# Patient Record
Sex: FEMALE | Race: WHITE | NOT HISPANIC OR LATINO | Employment: OTHER | URBAN - METROPOLITAN AREA
[De-identification: names, ages, dates, MRNs, and addresses within clinical notes are randomized per-mention and may not be internally consistent; named-entity substitution may affect disease eponyms.]

---

## 2023-05-23 LAB
ALANINE AMINOTRANSFERASE (SGPT) (U/L) IN SER/PLAS: 18 U/L (ref 7–45)
ALBUMIN (G/DL) IN SER/PLAS: 4.1 G/DL (ref 3.4–5)
ALKALINE PHOSPHATASE (U/L) IN SER/PLAS: 87 U/L (ref 33–136)
ANION GAP IN SER/PLAS: 15 MMOL/L (ref 10–20)
ASPARTATE AMINOTRANSFERASE (SGOT) (U/L) IN SER/PLAS: 14 U/L (ref 9–39)
BASOPHILS (10*3/UL) IN BLOOD BY AUTOMATED COUNT: 0.07 X10E9/L (ref 0–0.1)
BASOPHILS/100 LEUKOCYTES IN BLOOD BY AUTOMATED COUNT: 0.9 % (ref 0–2)
BILIRUBIN TOTAL (MG/DL) IN SER/PLAS: 0.6 MG/DL (ref 0–1.2)
CALCIUM (MG/DL) IN SER/PLAS: 9.2 MG/DL (ref 8.6–10.3)
CARBON DIOXIDE, TOTAL (MMOL/L) IN SER/PLAS: 20 MMOL/L (ref 21–32)
CHLORIDE (MMOL/L) IN SER/PLAS: 105 MMOL/L (ref 98–107)
CHOLESTEROL (MG/DL) IN SER/PLAS: 251 MG/DL (ref 0–199)
CHOLESTEROL IN HDL (MG/DL) IN SER/PLAS: 32.4 MG/DL
CHOLESTEROL/HDL RATIO: 7.7
CREATININE (MG/DL) IN SER/PLAS: 0.65 MG/DL (ref 0.5–1.05)
EOSINOPHILS (10*3/UL) IN BLOOD BY AUTOMATED COUNT: 0.15 X10E9/L (ref 0–0.4)
EOSINOPHILS/100 LEUKOCYTES IN BLOOD BY AUTOMATED COUNT: 2 % (ref 0–6)
ERYTHROCYTE DISTRIBUTION WIDTH (RATIO) BY AUTOMATED COUNT: 11.9 % (ref 11.5–14.5)
ERYTHROCYTE MEAN CORPUSCULAR HEMOGLOBIN CONCENTRATION (G/DL) BY AUTOMATED: 34 G/DL (ref 32–36)
ERYTHROCYTE MEAN CORPUSCULAR VOLUME (FL) BY AUTOMATED COUNT: 91 FL (ref 80–100)
ERYTHROCYTES (10*6/UL) IN BLOOD BY AUTOMATED COUNT: 4.75 X10E12/L (ref 4–5.2)
GFR FEMALE: >90 ML/MIN/1.73M2
GLUCOSE (MG/DL) IN SER/PLAS: 110 MG/DL (ref 74–99)
HEMATOCRIT (%) IN BLOOD BY AUTOMATED COUNT: 43 % (ref 36–46)
HEMOGLOBIN (G/DL) IN BLOOD: 14.6 G/DL (ref 12–16)
IMMATURE GRANULOCYTES/100 LEUKOCYTES IN BLOOD BY AUTOMATED COUNT: 0.4 % (ref 0–0.9)
LDL: 162 MG/DL (ref 0–99)
LEUKOCYTES (10*3/UL) IN BLOOD BY AUTOMATED COUNT: 7.7 X10E9/L (ref 4.4–11.3)
LYMPHOCYTES (10*3/UL) IN BLOOD BY AUTOMATED COUNT: 2.77 X10E9/L (ref 0.8–3)
LYMPHOCYTES/100 LEUKOCYTES IN BLOOD BY AUTOMATED COUNT: 36.2 % (ref 13–44)
MONOCYTES (10*3/UL) IN BLOOD BY AUTOMATED COUNT: 0.69 X10E9/L (ref 0.05–0.8)
MONOCYTES/100 LEUKOCYTES IN BLOOD BY AUTOMATED COUNT: 9 % (ref 2–10)
NEUTROPHILS (10*3/UL) IN BLOOD BY AUTOMATED COUNT: 3.94 X10E9/L (ref 1.6–5.5)
NEUTROPHILS/100 LEUKOCYTES IN BLOOD BY AUTOMATED COUNT: 51.5 % (ref 40–80)
NON HDL CHOLESTEROL: 219 MG/DL
PLATELETS (10*3/UL) IN BLOOD AUTOMATED COUNT: 317 X10E9/L (ref 150–450)
POTASSIUM (MMOL/L) IN SER/PLAS: 4 MMOL/L (ref 3.5–5.3)
PROTEIN TOTAL: 6.7 G/DL (ref 6.4–8.2)
SODIUM (MMOL/L) IN SER/PLAS: 136 MMOL/L (ref 136–145)
TRIGLYCERIDE (MG/DL) IN SER/PLAS: 284 MG/DL (ref 0–149)
UREA NITROGEN (MG/DL) IN SER/PLAS: 16 MG/DL (ref 6–23)
VLDL: 57 MG/DL (ref 0–40)

## 2023-06-06 RX ORDER — MECOBALAMIN 1000 MCG
TABLET,CHEWABLE ORAL DAILY
COMMUNITY
Start: 2022-12-07

## 2023-06-06 RX ORDER — DESMOPRESSIN ACETATE 0.1 MG/1
0.25 TABLET ORAL DAILY
COMMUNITY
Start: 2016-03-03 | End: 2023-06-07 | Stop reason: SDUPTHER

## 2023-06-06 RX ORDER — GABAPENTIN 300 MG/1
1 CAPSULE ORAL NIGHTLY
COMMUNITY
Start: 2019-11-21

## 2023-06-06 RX ORDER — GABAPENTIN 100 MG/1
100 CAPSULE ORAL DAILY
COMMUNITY
Start: 2017-04-11

## 2023-06-06 RX ORDER — CLONAZEPAM 0.5 MG/1
0.5 TABLET ORAL 2 TIMES DAILY PRN
COMMUNITY
Start: 2016-02-17

## 2023-06-06 RX ORDER — MULTIVITAMIN
1 TABLET ORAL DAILY
COMMUNITY
Start: 2022-12-07

## 2023-06-06 RX ORDER — OLMESARTAN MEDOXOMIL 40 MG/1
1 TABLET ORAL DAILY
COMMUNITY
Start: 2020-05-05 | End: 2023-06-07 | Stop reason: SDUPTHER

## 2023-06-06 RX ORDER — MINERAL OIL
ENEMA (ML) RECTAL DAILY
COMMUNITY

## 2023-06-07 ENCOUNTER — OFFICE VISIT (OUTPATIENT)
Dept: PRIMARY CARE | Facility: CLINIC | Age: 73
End: 2023-06-07
Payer: MEDICARE

## 2023-06-07 VITALS
HEART RATE: 70 BPM | TEMPERATURE: 96.4 F | DIASTOLIC BLOOD PRESSURE: 68 MMHG | SYSTOLIC BLOOD PRESSURE: 128 MMHG | OXYGEN SATURATION: 98 %

## 2023-06-07 DIAGNOSIS — I10 ESSENTIAL HYPERTENSION: ICD-10-CM

## 2023-06-07 DIAGNOSIS — E78.00 HYPERCHOLESTEROLEMIA: ICD-10-CM

## 2023-06-07 DIAGNOSIS — Z00.00 MEDICARE ANNUAL WELLNESS VISIT, SUBSEQUENT: Primary | ICD-10-CM

## 2023-06-07 DIAGNOSIS — E23.2 DI (DIABETES INSIPIDUS) (MULTI): ICD-10-CM

## 2023-06-07 PROBLEM — C64.2 RENAL CELL CARCINOMA OF LEFT KIDNEY (MULTI): Status: ACTIVE | Noted: 2023-06-07

## 2023-06-07 PROBLEM — F41.9 ANXIETY: Status: ACTIVE | Noted: 2023-06-07

## 2023-06-07 PROCEDURE — G0439 PPPS, SUBSEQ VISIT: HCPCS | Performed by: INTERNAL MEDICINE

## 2023-06-07 PROCEDURE — 99214 OFFICE O/P EST MOD 30 MIN: CPT | Performed by: INTERNAL MEDICINE

## 2023-06-07 PROCEDURE — 3074F SYST BP LT 130 MM HG: CPT | Performed by: INTERNAL MEDICINE

## 2023-06-07 PROCEDURE — 1036F TOBACCO NON-USER: CPT | Performed by: INTERNAL MEDICINE

## 2023-06-07 PROCEDURE — 3078F DIAST BP <80 MM HG: CPT | Performed by: INTERNAL MEDICINE

## 2023-06-07 PROCEDURE — 1159F MED LIST DOCD IN RCRD: CPT | Performed by: INTERNAL MEDICINE

## 2023-06-07 PROCEDURE — 1170F FXNL STATUS ASSESSED: CPT | Performed by: INTERNAL MEDICINE

## 2023-06-07 RX ORDER — OLMESARTAN MEDOXOMIL 40 MG/1
40 TABLET ORAL DAILY
Qty: 90 TABLET | Refills: 1 | Status: SHIPPED | OUTPATIENT
Start: 2023-06-07 | End: 2023-12-08 | Stop reason: SDUPTHER

## 2023-06-07 RX ORDER — DESMOPRESSIN ACETATE 0.1 MG/1
0.25 TABLET ORAL DAILY
Qty: 225 TABLET | Refills: 1 | Status: SHIPPED | OUTPATIENT
Start: 2023-06-07 | End: 2023-11-14 | Stop reason: SDUPTHER

## 2023-06-07 ASSESSMENT — ENCOUNTER SYMPTOMS
LOSS OF SENSATION IN FEET: 0
GASTROINTESTINAL NEGATIVE: 1
RESPIRATORY NEGATIVE: 1
DEPRESSION: 0
NEUROLOGICAL NEGATIVE: 1
CONSTITUTIONAL NEGATIVE: 1
CARDIOVASCULAR NEGATIVE: 1
EYES NEGATIVE: 1
ALLERGIC/IMMUNOLOGIC NEGATIVE: 1
OCCASIONAL FEELINGS OF UNSTEADINESS: 0
MUSCULOSKELETAL NEGATIVE: 1
PSYCHIATRIC NEGATIVE: 1
ENDOCRINE NEGATIVE: 1
HEMATOLOGIC/LYMPHATIC NEGATIVE: 1

## 2023-06-07 ASSESSMENT — ACTIVITIES OF DAILY LIVING (ADL)
TAKING_MEDICATION: INDEPENDENT
GROCERY_SHOPPING: INDEPENDENT
BATHING: INDEPENDENT
MANAGING_FINANCES: INDEPENDENT
DRESSING: INDEPENDENT
DOING_HOUSEWORK: INDEPENDENT

## 2023-06-07 NOTE — PROGRESS NOTES
Subjective   Patient ID: Henri Escobar is a 72 y.o. female who presents for 6 month follow up  and Medicare Annual Wellness Visit Subsequent.  Patient presents in follow up regarding hypertension.  Blood pressure has been well controlled on current therapy.  No adverse effects of medication reported.  Patient denies chest pain, palpitations, shortness of breath, orthopnea, fatigue or edema.    DI is well compensated on current therapy.    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  She states this is getting worse and controlling her life.  She has been tried on all SSRI's as well as Buspar without improvement.          Review of Systems   Constitutional: Negative.    HENT: Negative.     Eyes: Negative.    Respiratory: Negative.     Cardiovascular: Negative.    Gastrointestinal: Negative.    Endocrine: Negative.    Genitourinary: Negative.    Musculoskeletal: Negative.    Skin: Negative.    Allergic/Immunologic: Negative.    Neurological: Negative.    Hematological: Negative.    Psychiatric/Behavioral: Negative.         Objective     Blood pressure 128/68, pulse 70, temperature 35.8 °C (96.4 °F), temperature source Temporal, SpO2 98 %.   Physical Exam  Vitals reviewed.   Constitutional:       Appearance: Normal appearance.   Neck:      Vascular: No carotid bruit.   Cardiovascular:      Rate and Rhythm: Normal rate and regular rhythm.      Pulses: Normal pulses.      Heart sounds: Normal heart sounds. No murmur heard.  Pulmonary:      Effort: Pulmonary effort is normal.      Breath sounds: Normal breath sounds. No wheezing or rales.   Abdominal:      General: Abdomen is flat. There is no distension.      Palpations: Abdomen is soft.      Tenderness: There is no abdominal tenderness.   Musculoskeletal:         General: Normal range of motion.      Cervical back: Normal range of motion and neck supple.    Skin:     General: Skin is warm and dry.   Neurological:      General: No focal deficit present.      Mental Status: She is alert and oriented to person, place, and time.   Psychiatric:         Mood and Affect: Mood normal.         Behavior: Behavior normal.         Assessment/Plan   Problem List Items Addressed This Visit          Nervous    DI (diabetes insipidus) (CMS/[facility])       Circulatory    Essential hypertension       Other    Hypercholesterolemia    Medicare annual wellness visit, subsequent - Primary     Medicare annual wellness completed with no new findings or issues identified.  Patient has documented advanced care planning and POA in place  BP well controlled on current therapy.  Will continue present therapy and follow.  Diabetes insipidus remains well controlled with current therapy.  She has improved LDL level by almost 50 points over the last 6 months with diet and exercise.  She is encouraged as it is noted she is intolerant of all statins and Zetia.  In re:  panic, I recommend she see Dr. Mechelle Asencio for psychologic counseling.    > 30 minutes was spent with the patient today, the majority of which was spent on review of history and medications as well as counselling on care plan and/or coordination of care.     Follow up in 6 months  Lab to be drawn 1 week prior to next office visit.

## 2023-07-18 RX ORDER — DESVENLAFAXINE SUCCINATE 50 MG/1
TABLET, EXTENDED RELEASE ORAL 3 TIMES DAILY
COMMUNITY
Start: 2023-01-12 | End: 2023-12-08 | Stop reason: WASHOUT

## 2023-07-19 ENCOUNTER — OFFICE VISIT (OUTPATIENT)
Dept: PRIMARY CARE | Facility: CLINIC | Age: 73
End: 2023-07-19
Payer: MEDICARE

## 2023-07-19 VITALS
DIASTOLIC BLOOD PRESSURE: 82 MMHG | HEART RATE: 64 BPM | TEMPERATURE: 96.9 F | OXYGEN SATURATION: 97 % | SYSTOLIC BLOOD PRESSURE: 130 MMHG

## 2023-07-19 DIAGNOSIS — H02.9 EYELID LESION, BENIGN: Primary | ICD-10-CM

## 2023-07-19 PROCEDURE — 3079F DIAST BP 80-89 MM HG: CPT | Performed by: INTERNAL MEDICINE

## 2023-07-19 PROCEDURE — 1036F TOBACCO NON-USER: CPT | Performed by: INTERNAL MEDICINE

## 2023-07-19 PROCEDURE — 1159F MED LIST DOCD IN RCRD: CPT | Performed by: INTERNAL MEDICINE

## 2023-07-19 PROCEDURE — 3075F SYST BP GE 130 - 139MM HG: CPT | Performed by: INTERNAL MEDICINE

## 2023-07-19 PROCEDURE — 99213 OFFICE O/P EST LOW 20 MIN: CPT | Performed by: INTERNAL MEDICINE

## 2023-07-19 ASSESSMENT — ENCOUNTER SYMPTOMS
CONSTITUTIONAL NEGATIVE: 1
EYES NEGATIVE: 1
HEMATOLOGIC/LYMPHATIC NEGATIVE: 1
ENDOCRINE NEGATIVE: 1
MUSCULOSKELETAL NEGATIVE: 1
CARDIOVASCULAR NEGATIVE: 1
PSYCHIATRIC NEGATIVE: 1
ROS SKIN COMMENTS: SEE HPI
GASTROINTESTINAL NEGATIVE: 1
RESPIRATORY NEGATIVE: 1
ALLERGIC/IMMUNOLOGIC NEGATIVE: 1
NEUROLOGICAL NEGATIVE: 1

## 2023-07-19 NOTE — PROGRESS NOTES
Subjective   Patient ID: Henri Escobar is a 72 y.o. female who presents for skin issues .  Patient presents today with a complaint of a skin change of the left upper eye lid medially.  No pain, change in vision or pruritus.  Exam reveals a white nodular plaque formation over the area described with no other noted skin changes.        Review of Systems   Constitutional: Negative.    HENT: Negative.     Eyes: Negative.    Respiratory: Negative.     Cardiovascular: Negative.    Gastrointestinal: Negative.    Endocrine: Negative.    Genitourinary: Negative.    Musculoskeletal: Negative.    Skin: Negative.         See HPI   Allergic/Immunologic: Negative.    Neurological: Negative.    Hematological: Negative.    Psychiatric/Behavioral: Negative.         Objective     Blood pressure 130/82, pulse 64, temperature 36.1 °C (96.9 °F), temperature source Temporal, SpO2 97 %.   Physical Exam  Constitutional:       Appearance: Normal appearance.   HENT:      Head: Normocephalic and atraumatic.   Eyes:      Comments: See description in HPI   Pulmonary:      Effort: Pulmonary effort is normal.   Musculoskeletal:      Cervical back: Normal range of motion and neck supple.   Neurological:      Mental Status: She is alert.         Assessment/Plan   Problem List Items Addressed This Visit    None  Visit Diagnoses       Eyelid lesion, benign    -  Primary    Relevant Orders    Referral to Ophthalmology          I advised the patient that I would recommend an oculo-plastics eye specialist.  I called [facility] on her behalf to get the name of their specialist and then did generic referral and wrote the name of the surgeon on the referral form for her.  She is reassured.    Follow up as scheduled

## 2023-11-14 DIAGNOSIS — E23.2 DI (DIABETES INSIPIDUS) (MULTI): ICD-10-CM

## 2023-11-14 RX ORDER — DESMOPRESSIN ACETATE 0.1 MG/1
0.25 TABLET ORAL DAILY
Qty: 75 TABLET | Refills: 0 | Status: SHIPPED | OUTPATIENT
Start: 2023-11-14 | End: 2023-12-08 | Stop reason: SDUPTHER

## 2023-11-14 NOTE — TELEPHONE ENCOUNTER
Desmopressin ordered at office visit in July is Lost in [place]. The mail order pharmacy has done an override on the prescription. Can  you order a 30 day supply from local pharmacy to hold her until the mail order supply reaches her?    Local pharmacy is [facility] in [place]

## 2023-12-06 ENCOUNTER — APPOINTMENT (OUTPATIENT)
Dept: PRIMARY CARE | Facility: CLINIC | Age: 73
End: 2023-12-06
Payer: MEDICARE

## 2023-12-07 ENCOUNTER — LAB (OUTPATIENT)
Dept: LAB | Facility: LAB | Age: 73
End: 2023-12-07
Payer: MEDICARE

## 2023-12-07 DIAGNOSIS — E23.2 DI (DIABETES INSIPIDUS) (MULTI): ICD-10-CM

## 2023-12-07 DIAGNOSIS — E78.00 HYPERCHOLESTEROLEMIA: ICD-10-CM

## 2023-12-07 DIAGNOSIS — I10 ESSENTIAL HYPERTENSION: ICD-10-CM

## 2023-12-07 LAB
ALBUMIN SERPL BCP-MCNC: 4.3 G/DL (ref 3.4–5)
ALP SERPL-CCNC: 96 U/L (ref 33–136)
ALT SERPL W P-5'-P-CCNC: 20 U/L (ref 7–45)
ANION GAP SERPL CALC-SCNC: 11 MMOL/L (ref 10–20)
AST SERPL W P-5'-P-CCNC: 12 U/L (ref 9–39)
BASOPHILS # BLD AUTO: 0.07 X10*3/UL (ref 0–0.1)
BASOPHILS NFR BLD AUTO: 0.8 %
BILIRUB SERPL-MCNC: 0.3 MG/DL (ref 0–1.2)
BUN SERPL-MCNC: 25 MG/DL (ref 6–23)
CALCIUM SERPL-MCNC: 9.4 MG/DL (ref 8.6–10.3)
CHLORIDE SERPL-SCNC: 105 MMOL/L (ref 98–107)
CHOLEST SERPL-MCNC: 283 MG/DL (ref 0–199)
CHOLESTEROL/HDL RATIO: 7.7
CO2 SERPL-SCNC: 27 MMOL/L (ref 21–32)
CREAT SERPL-MCNC: 0.55 MG/DL (ref 0.5–1.05)
EOSINOPHIL # BLD AUTO: 0.17 X10*3/UL (ref 0–0.4)
EOSINOPHIL NFR BLD AUTO: 1.9 %
ERYTHROCYTE [DISTWIDTH] IN BLOOD BY AUTOMATED COUNT: 12.2 % (ref 11.5–14.5)
GFR SERPL CREATININE-BSD FRML MDRD: >90 ML/MIN/1.73M*2
GLUCOSE SERPL-MCNC: 106 MG/DL (ref 74–99)
HCT VFR BLD AUTO: 44.6 % (ref 36–46)
HDLC SERPL-MCNC: 36.7 MG/DL
HGB BLD-MCNC: 14.6 G/DL (ref 12–16)
IMM GRANULOCYTES # BLD AUTO: 0.05 X10*3/UL (ref 0–0.5)
IMM GRANULOCYTES NFR BLD AUTO: 0.6 % (ref 0–0.9)
LDLC SERPL CALC-MCNC: ABNORMAL MG/DL
LYMPHOCYTES # BLD AUTO: 3.36 X10*3/UL (ref 0.8–3)
LYMPHOCYTES NFR BLD AUTO: 38.4 %
MCH RBC QN AUTO: 30.9 PG (ref 26–34)
MCHC RBC AUTO-ENTMCNC: 32.7 G/DL (ref 32–36)
MCV RBC AUTO: 95 FL (ref 80–100)
MONOCYTES # BLD AUTO: 0.71 X10*3/UL (ref 0.05–0.8)
MONOCYTES NFR BLD AUTO: 8.1 %
NEUTROPHILS # BLD AUTO: 4.39 X10*3/UL (ref 1.6–5.5)
NEUTROPHILS NFR BLD AUTO: 50.2 %
NON HDL CHOLESTEROL: 246 MG/DL (ref 0–149)
NRBC BLD-RTO: 0 /100 WBCS (ref 0–0)
PLATELET # BLD AUTO: 358 X10*3/UL (ref 150–450)
POTASSIUM SERPL-SCNC: 4 MMOL/L (ref 3.5–5.3)
PROT SERPL-MCNC: 6.7 G/DL (ref 6.4–8.2)
RBC # BLD AUTO: 4.72 X10*6/UL (ref 4–5.2)
SODIUM SERPL-SCNC: 139 MMOL/L (ref 136–145)
TRIGL SERPL-MCNC: 463 MG/DL (ref 0–149)
VLDL: ABNORMAL
WBC # BLD AUTO: 8.8 X10*3/UL (ref 4.4–11.3)

## 2023-12-07 PROCEDURE — 80053 COMPREHEN METABOLIC PANEL: CPT

## 2023-12-07 PROCEDURE — 36415 COLL VENOUS BLD VENIPUNCTURE: CPT

## 2023-12-07 PROCEDURE — 85025 COMPLETE CBC W/AUTO DIFF WBC: CPT

## 2023-12-07 PROCEDURE — 80061 LIPID PANEL: CPT

## 2023-12-08 ENCOUNTER — OFFICE VISIT (OUTPATIENT)
Dept: PRIMARY CARE | Facility: CLINIC | Age: 73
End: 2023-12-08
Payer: MEDICARE

## 2023-12-08 VITALS — TEMPERATURE: 97.4 F | SYSTOLIC BLOOD PRESSURE: 132 MMHG | DIASTOLIC BLOOD PRESSURE: 84 MMHG | HEART RATE: 76 BPM

## 2023-12-08 DIAGNOSIS — I10 ESSENTIAL HYPERTENSION: Primary | ICD-10-CM

## 2023-12-08 DIAGNOSIS — E23.2 DI (DIABETES INSIPIDUS) (MULTI): ICD-10-CM

## 2023-12-08 DIAGNOSIS — E78.00 HYPERCHOLESTEROLEMIA: ICD-10-CM

## 2023-12-08 PROBLEM — C64.2 RENAL CELL CARCINOMA OF LEFT KIDNEY (MULTI): Status: RESOLVED | Noted: 2023-06-07 | Resolved: 2023-12-08

## 2023-12-08 PROBLEM — Z85.528 HISTORY OF RENAL CELL CARCINOMA: Status: ACTIVE | Noted: 2023-12-08

## 2023-12-08 PROBLEM — M17.11 PRIMARY OSTEOARTHRITIS OF RIGHT KNEE: Status: ACTIVE | Noted: 2023-12-08

## 2023-12-08 PROCEDURE — 1159F MED LIST DOCD IN RCRD: CPT | Performed by: INTERNAL MEDICINE

## 2023-12-08 PROCEDURE — 3079F DIAST BP 80-89 MM HG: CPT | Performed by: INTERNAL MEDICINE

## 2023-12-08 PROCEDURE — 1036F TOBACCO NON-USER: CPT | Performed by: INTERNAL MEDICINE

## 2023-12-08 PROCEDURE — 3075F SYST BP GE 130 - 139MM HG: CPT | Performed by: INTERNAL MEDICINE

## 2023-12-08 PROCEDURE — 99214 OFFICE O/P EST MOD 30 MIN: CPT | Performed by: INTERNAL MEDICINE

## 2023-12-08 RX ORDER — OLMESARTAN MEDOXOMIL 40 MG/1
40 TABLET ORAL DAILY
Qty: 90 TABLET | Refills: 1 | Status: SHIPPED | OUTPATIENT
Start: 2023-12-08 | End: 2024-01-10 | Stop reason: SDUPTHER

## 2023-12-08 RX ORDER — OLMESARTAN MEDOXOMIL 40 MG/1
40 TABLET ORAL DAILY
Qty: 30 TABLET | Refills: 1 | Status: SHIPPED | OUTPATIENT
Start: 2023-12-08 | End: 2024-01-08 | Stop reason: SDUPTHER

## 2023-12-08 RX ORDER — DESMOPRESSIN ACETATE 0.1 MG/1
0.25 TABLET ORAL DAILY
Qty: 225 TABLET | Refills: 1 | Status: SHIPPED | OUTPATIENT
Start: 2023-12-08 | End: 2024-01-10 | Stop reason: SDUPTHER

## 2023-12-08 ASSESSMENT — ENCOUNTER SYMPTOMS
HEMATOLOGIC/LYMPHATIC NEGATIVE: 1
ENDOCRINE NEGATIVE: 1
CONSTITUTIONAL NEGATIVE: 1
GASTROINTESTINAL NEGATIVE: 1
PSYCHIATRIC NEGATIVE: 1
RESPIRATORY NEGATIVE: 1
MUSCULOSKELETAL NEGATIVE: 1
EYES NEGATIVE: 1
ALLERGIC/IMMUNOLOGIC NEGATIVE: 1
CARDIOVASCULAR NEGATIVE: 1
NEUROLOGICAL NEGATIVE: 1

## 2023-12-08 NOTE — PROGRESS NOTES
Subjective   Patient ID: Henir Escobar is a 73 y.o. female who presents for 6 month follow up .  Patient presents in follow up regarding hypertension.  Blood pressure has been well controlled on current therapy.  No adverse effects of medication reported.  Patient denies chest pain, palpitations, shortness of breath, orthopnea, fatigue or edema.    DI is well compensated on current therapy.            Review of Systems   Constitutional: Negative.    HENT: Negative.     Eyes: Negative.    Respiratory: Negative.     Cardiovascular: Negative.    Gastrointestinal: Negative.    Endocrine: Negative.    Genitourinary: Negative.    Musculoskeletal: Negative.    Skin: Negative.    Allergic/Immunologic: Negative.    Neurological: Negative.    Hematological: Negative.    Psychiatric/Behavioral: Negative.         Objective     Blood pressure 132/84, pulse 76, temperature 36.3 °C (97.4 °F), temperature source Temporal.   Physical Exam  Vitals reviewed.   Constitutional:       Appearance: Normal appearance.   Neck:      Vascular: No carotid bruit.   Cardiovascular:      Rate and Rhythm: Normal rate and regular rhythm.      Pulses: Normal pulses.      Heart sounds: Normal heart sounds. No murmur heard.  Pulmonary:      Effort: Pulmonary effort is normal.      Breath sounds: Normal breath sounds. No wheezing or rales.   Abdominal:      General: Abdomen is flat. There is no distension.      Palpations: Abdomen is soft.      Tenderness: There is no abdominal tenderness.   Musculoskeletal:         General: Normal range of motion.      Cervical back: Normal range of motion and neck supple.   Skin:     General: Skin is warm and dry.   Neurological:      General: No focal deficit present.      Mental Status: She is alert and oriented to person, place, and time.   Psychiatric:         Mood and Affect: Mood normal.         Behavior: Behavior normal.         Assessment/Plan   Problem List Items Addressed This Visit       DI (diabetes  "insipidus) (CMS/[facility])    Relevant Medications    desmopressin (DDAVP) 0.1 mg tablet    Other Relevant Orders    Comprehensive Metabolic Panel    Essential hypertension - Primary    Relevant Medications    olmesartan (BENIcar) 40 mg tablet    olmesartan (BENIcar) 40 mg tablet    Other Relevant Orders    CT cardiac scoring wo IV contrast    Comprehensive Metabolic Panel    CBC and Auto Differential    Hypercholesterolemia    Relevant Orders    CT cardiac scoring wo IV contrast       BP well controlled on current therapy.  Will continue present therapy and follow.  Diabetes insipidus remains well controlled with current therapy.  Lipids are reviewed showing a rise in total cholesterol as well as triglycerides.  Trig level >400 so LDL is not able to be calculated.  She is encouraged as it is noted she is intolerant of all statins and Zetia.  In view of the fact that she is at age 73 with no CV history, it may not be imperative to intervene.  I suggest that coronary calcium scoring be done to determine current risk.  Order is entered and she will decide if she wants to do this or not.  Will not plan on checking lipid with next lab based on the above.  In re:  panic with being in a car, I recommended at last that she see Dr. Mechelle Asencio for psychologic counseling.   She did not see her due to coverage issues.  She states she is seeing a different psychologist and currently rides in the back when  is driving or drives on back roads only if she drives.    It is noted today that the patient is clearly upset but told me \"I'm fine.\"  At the end of the visit, it became clearer as to why she was upset which had to do with her mail order pharmacy not sending her refills at the appropriate time and one of her medications getting \"lost\" in [place] such that she needed to get a 30 day supply locally which we happily did for her.  She states that the mail order pharmacy told her that they had sent requests to us for " refills that she was not yet due for which were not answered.  I explained that the software system is set up to reject these requests because 99.9% of them were made far too early and that it was her responsibility to let us know if she was short on medication likely due to not having the correct amount sent to her in the first place.    > 30 minutes was spent with the patient today, the majority of which was spent on review of history and medications as well as counselling on care plan and/or coordination of care.     Follow up in 6 months  Lab to be drawn 1 week prior to next office visit.

## 2023-12-12 ENCOUNTER — HOSPITAL ENCOUNTER (OUTPATIENT)
Dept: RADIOLOGY | Facility: HOSPITAL | Age: 73
Discharge: HOME | End: 2023-12-12
Payer: MEDICARE

## 2023-12-12 DIAGNOSIS — I10 ESSENTIAL HYPERTENSION: ICD-10-CM

## 2023-12-12 DIAGNOSIS — E78.00 HYPERCHOLESTEROLEMIA: ICD-10-CM

## 2023-12-12 PROCEDURE — 75571 CT HRT W/O DYE W/CA TEST: CPT

## 2023-12-15 ENCOUNTER — TELEPHONE (OUTPATIENT)
Dept: OBSTETRICS AND GYNECOLOGY | Facility: CLINIC | Age: 73
End: 2023-12-15
Payer: MEDICARE

## 2023-12-15 DIAGNOSIS — Z12.31 SCREENING MAMMOGRAM FOR BREAST CANCER: ICD-10-CM

## 2024-01-08 ENCOUNTER — HOSPITAL ENCOUNTER (OUTPATIENT)
Dept: RADIOLOGY | Facility: HOSPITAL | Age: 74
Discharge: HOME | End: 2024-01-08
Payer: MEDICARE

## 2024-01-08 DIAGNOSIS — Z12.31 SCREENING MAMMOGRAM FOR BREAST CANCER: ICD-10-CM

## 2024-01-08 DIAGNOSIS — I10 ESSENTIAL HYPERTENSION: ICD-10-CM

## 2024-01-08 PROCEDURE — 77067 SCR MAMMO BI INCL CAD: CPT

## 2024-01-08 PROCEDURE — 77063 BREAST TOMOSYNTHESIS BI: CPT

## 2024-01-08 RX ORDER — OLMESARTAN MEDOXOMIL 40 MG/1
40 TABLET ORAL DAILY
Qty: 30 TABLET | Refills: 0 | Status: SHIPPED | OUTPATIENT
Start: 2024-01-08 | End: 2024-01-10 | Stop reason: SDUPTHER

## 2024-01-08 NOTE — TELEPHONE ENCOUNTER
Patient stated that [facility] is under a new company and the Olmesartan 40 MG prescription was cancelled and the patient is asking for another 30 day supply to be sent to [facility].      Rx Refill Request Telephone Encounter    Name:  Henri Escobar  :  [id number]  Medication Name:      olmesartan (BENIcar) 40 mg tablet [[id number]]             Specific Pharmacy location:   [facility]  Date of last appointment:  2023  Date of next appointment:  2024  Best number to reach patient:  [phone]

## 2024-01-10 DIAGNOSIS — E23.2 DI (DIABETES INSIPIDUS) (MULTI): ICD-10-CM

## 2024-01-10 DIAGNOSIS — I10 ESSENTIAL HYPERTENSION: ICD-10-CM

## 2024-01-10 RX ORDER — OLMESARTAN MEDOXOMIL 40 MG/1
40 TABLET ORAL DAILY
Qty: 90 TABLET | Refills: 1 | Status: SHIPPED | OUTPATIENT
Start: 2024-01-10 | End: 2024-05-03 | Stop reason: SDUPTHER

## 2024-01-10 RX ORDER — DESMOPRESSIN ACETATE 0.1 MG/1
0.25 TABLET ORAL DAILY
Qty: 225 TABLET | Refills: 1 | Status: SHIPPED | OUTPATIENT
Start: 2024-01-10 | End: 2024-05-03 | Stop reason: SDUPTHER

## 2024-01-10 NOTE — TELEPHONE ENCOUNTER
Patient said the reason this needs resent to [facility] again is because the pharmacy changed disrupters and did not send over her scripts when this occurred. She is also requesting a 90 day of desmopressin be resent over as well. Please advise.

## 2024-02-23 ENCOUNTER — TELEPHONE (OUTPATIENT)
Dept: UROLOGY | Facility: CLINIC | Age: 74
End: 2024-02-23
Payer: MEDICARE

## 2024-02-23 DIAGNOSIS — C64.9 PRIMARY MALIGNANT NEOPLASM OF KIDNEY WITH METASTASIS FROM KIDNEY TO OTHER SITE, UNSPECIFIED LATERALITY (MULTI): Primary | ICD-10-CM

## 2024-02-23 NOTE — TELEPHONE ENCOUNTER
Pt calling in wanting to know if we can resubmit a CT Urography w 3D volume Rendered Imaging and CT abdomen pelvis w IV Contrast just so that she can get in it done in July. The expiration date on her current ones end in June and she needs the insurance to cover it after a year of her last one.

## 2024-02-26 ENCOUNTER — TELEPHONE (OUTPATIENT)
Dept: UROLOGY | Facility: CLINIC | Age: 74
End: 2024-02-26
Payer: MEDICARE

## 2024-02-26 DIAGNOSIS — C64.9 PRIMARY MALIGNANT NEOPLASM OF KIDNEY WITH METASTASIS FROM KIDNEY TO OTHER SITE, UNSPECIFIED LATERALITY (MULTI): Primary | ICD-10-CM

## 2024-02-26 NOTE — TELEPHONE ENCOUNTER
Patient asking that a new order be put in for CT abd and pelvis w/contrast to be done prior to coming in to see Dr. Benedict in July.  The one he put in for 6/23 is not will not be enough time per insurance  Thank you

## 2024-03-17 PROBLEM — Z01.419 WELL WOMAN EXAM WITH ROUTINE GYNECOLOGICAL EXAM: Status: ACTIVE | Noted: 2024-03-17

## 2024-03-17 NOTE — PROGRESS NOTES
Subjective   Patient ID: Henri Escobar is a 73 y.o. female who presents for Annual Exam.  Last annual exam with me was on 2/28/2023.   She is s/p supracervical hysterectomy. Last pap was negative in 2017 and she denies any history of cervical dysplasia. DEXA in 2019 returned with mild osteopenia of the hip.     She notes a bump in labia which she feels is a blocked hair follicle. She notes these more when she wears yoga pants. They resolve when they drain.     She is very concerned about her weight and her fatty liver. She also is seeing a psychiatrist and has done well with Wellbutrin in the past but developed side effects from this. She is very unhappy with her health and weight now and also states her sister has done remarkably well with Ozempic. She is very interested in trying this for health reasons along with hopes for this to help quiet her internal voice. We discussed this can be prescribed by her PCP or through the Weight loss center.           Review of Systems   Constitutional:  Negative for activity change.   HENT:  Negative for congestion.    Respiratory:  Negative for apnea and cough.    Cardiovascular:  Negative for chest pain.   Gastrointestinal:  Negative for constipation and diarrhea.   Genitourinary:  Negative for hematuria and vaginal pain.   Musculoskeletal:  Negative for joint swelling.   Neurological:  Negative for dizziness.   Psychiatric/Behavioral:  Negative for agitation.        Past Medical History:   Diagnosis Date    Personal history of other diseases of the circulatory system     History of hypertension      Past Surgical History:   Procedure Laterality Date    CHOLECYSTECTOMY      HYSTERECTOMY  02/08/2017    Hysterectomy    NEPHRECTOMY  07/20/2017    Nephrectomy Left    OOPHORECTOMY      TUBAL LIGATION        Allergies   Allergen Reactions    Ezetimibe Other    Metoprolol Other and Unknown    Mold Unknown    Omnicef [Cefdinir] Hives    Penicillins Hives    Statins-Hmg-Coa  Reductase Inhibitors Other    Cephalosporins Hives, Rash and Unknown    Codeine Hives and Rash      Current Outpatient Medications on File Prior to Visit   Medication Sig Dispense Refill    ascorbic acid/collagen hydr (COLLAGEN SKIN RENEWAL ORAL) Take by mouth once daily.      clonazePAM (KlonoPIN) 0.5 mg tablet Take 1 tablet (0.5 mg) by mouth 2 times a day as needed.      desmopressin (DDAVP) 0.1 mg tablet Take 2.5 tablets (0.25 mg) by mouth once daily. 225 tablet 1    fexofenadine (Allegra) 180 mg tablet Take by mouth once daily.      gabapentin (Neurontin) 100 mg capsule Take 1 capsule (100 mg) by mouth once daily.      gabapentin (Neurontin) 300 mg capsule Take 1 capsule (300 mg) by mouth once daily at bedtime.      mecobalamin, vitamin B12, 1,000 mcg tablet,chewable Chew once daily.      multivitamin with minerals (multivitamin with folic acid) tablet Take 1 tablet by mouth once daily.      olmesartan (BENIcar) 40 mg tablet Take 1 tablet (40 mg) by mouth once daily. 90 tablet 1     No current facility-administered medications on file prior to visit.        Objective   Physical Exam  Constitutional:       Appearance: Normal appearance.   Neck:      Thyroid: No thyromegaly.   Cardiovascular:      Rate and Rhythm: Normal rate and regular rhythm.      Heart sounds: Normal heart sounds.   Pulmonary:      Effort: Pulmonary effort is normal.      Breath sounds: Normal breath sounds.   Chest:      Chest wall: No mass.   Breasts:     Right: Normal. No inverted nipple, mass, nipple discharge or skin change.      Left: Normal. No inverted nipple, mass, nipple discharge or skin change.   Abdominal:      General: There is no distension.      Palpations: Abdomen is soft. There is no mass.      Tenderness: There is no abdominal tenderness.   Genitourinary:     General: Normal vulva.      Exam position: Lithotomy position.      Labia:         Right: No rash.         Left: No rash.       Vagina: Normal. No lesions.      Cervix:  No friability or lesion.      Uterus: Absent.       Adnexa: Right adnexa normal and left adnexa normal.        Right: No mass or tenderness.          Left: No mass or tenderness.        Comments: Atrophy is noted.  Tiny inclusion cyst is noted at right vaginal opening.   Musculoskeletal:         General: No deformity.      Cervical back: Neck supple.   Lymphadenopathy:      Cervical: No cervical adenopathy.   Skin:     General: Skin is warm and dry.      Findings: No rash.   Neurological:      General: No focal deficit present.      Mental Status: She is alert.   Psychiatric:         Mood and Affect: Mood normal.         Behavior: Behavior is cooperative.         Thought Content: Thought content normal.           Problem List Items Addressed This Visit       Well woman exam with routine gynecological exam - Primary    Overview     She is s/p supracervical hysterectomy with normal pap in 2017, no past cervical dysplasia, and no indication for further cervical cancer screening.   Mammogram was 1/2024.  DEXA in 2019 showed mild osteopenia of hip.         Current Assessment & Plan     Pap is not indicated. Mammogram is due in January 2025.   DEXA is ordered.   Regular exercise and attaining/maintaining a healthy weight is encouraged.   Adequate calcium intake with diet or supplements is encouraged.    We will notify of any abnormal results.

## 2024-03-17 NOTE — ASSESSMENT & PLAN NOTE
Pap is not indicated. Mammogram is due in January 2025.   DEXA is recommended but declined.  Regular exercise and attaining/maintaining a healthy weight is encouraged.   Adequate calcium intake with diet or supplements is encouraged.    We will notify of any abnormal results.   
Spontaneous

## 2024-03-20 ENCOUNTER — OFFICE VISIT (OUTPATIENT)
Dept: OBSTETRICS AND GYNECOLOGY | Facility: CLINIC | Age: 74
End: 2024-03-20
Payer: MEDICARE

## 2024-03-20 VITALS — SYSTOLIC BLOOD PRESSURE: 138 MMHG | DIASTOLIC BLOOD PRESSURE: 92 MMHG | HEIGHT: 64 IN | BODY MASS INDEX: 29.18 KG/M2

## 2024-03-20 DIAGNOSIS — Z01.419 WELL WOMAN EXAM WITH ROUTINE GYNECOLOGICAL EXAM: Primary | ICD-10-CM

## 2024-03-20 PROCEDURE — 1036F TOBACCO NON-USER: CPT | Performed by: OBSTETRICS & GYNECOLOGY

## 2024-03-20 PROCEDURE — 99397 PER PM REEVAL EST PAT 65+ YR: CPT | Performed by: OBSTETRICS & GYNECOLOGY

## 2024-03-20 PROCEDURE — 3080F DIAST BP >= 90 MM HG: CPT | Performed by: OBSTETRICS & GYNECOLOGY

## 2024-03-20 PROCEDURE — 1159F MED LIST DOCD IN RCRD: CPT | Performed by: OBSTETRICS & GYNECOLOGY

## 2024-03-20 PROCEDURE — 3075F SYST BP GE 130 - 139MM HG: CPT | Performed by: OBSTETRICS & GYNECOLOGY

## 2024-03-20 PROCEDURE — 1160F RVW MEDS BY RX/DR IN RCRD: CPT | Performed by: OBSTETRICS & GYNECOLOGY

## 2024-03-20 ASSESSMENT — ENCOUNTER SYMPTOMS
HEMATURIA: 0
JOINT SWELLING: 0
DIARRHEA: 0
APNEA: 0
AGITATION: 0
COUGH: 0
DIZZINESS: 0
CONSTIPATION: 0
ACTIVITY CHANGE: 0

## 2024-03-26 ENCOUNTER — OFFICE VISIT (OUTPATIENT)
Dept: PRIMARY CARE | Facility: CLINIC | Age: 74
End: 2024-03-26
Payer: MEDICARE

## 2024-03-26 VITALS
SYSTOLIC BLOOD PRESSURE: 139 MMHG | OXYGEN SATURATION: 98 % | TEMPERATURE: 96.7 F | HEART RATE: 71 BPM | DIASTOLIC BLOOD PRESSURE: 82 MMHG

## 2024-03-26 DIAGNOSIS — I10 ESSENTIAL HYPERTENSION: ICD-10-CM

## 2024-03-26 DIAGNOSIS — E23.2 DI (DIABETES INSIPIDUS) (MULTI): ICD-10-CM

## 2024-03-26 DIAGNOSIS — F41.9 ANXIETY: Primary | ICD-10-CM

## 2024-03-26 PROCEDURE — 1160F RVW MEDS BY RX/DR IN RCRD: CPT | Performed by: INTERNAL MEDICINE

## 2024-03-26 PROCEDURE — 99213 OFFICE O/P EST LOW 20 MIN: CPT | Performed by: INTERNAL MEDICINE

## 2024-03-26 PROCEDURE — 3079F DIAST BP 80-89 MM HG: CPT | Performed by: INTERNAL MEDICINE

## 2024-03-26 PROCEDURE — 1159F MED LIST DOCD IN RCRD: CPT | Performed by: INTERNAL MEDICINE

## 2024-03-26 PROCEDURE — 1036F TOBACCO NON-USER: CPT | Performed by: INTERNAL MEDICINE

## 2024-03-26 PROCEDURE — 3075F SYST BP GE 130 - 139MM HG: CPT | Performed by: INTERNAL MEDICINE

## 2024-03-26 ASSESSMENT — ENCOUNTER SYMPTOMS
ALLERGIC/IMMUNOLOGIC NEGATIVE: 1
CONSTITUTIONAL NEGATIVE: 1
NEUROLOGICAL NEGATIVE: 1
CARDIOVASCULAR NEGATIVE: 1
MUSCULOSKELETAL NEGATIVE: 1
GASTROINTESTINAL NEGATIVE: 1
EYES NEGATIVE: 1
HEMATOLOGIC/LYMPHATIC NEGATIVE: 1
NERVOUS/ANXIOUS: 1
ENDOCRINE NEGATIVE: 1
RESPIRATORY NEGATIVE: 1

## 2024-03-26 NOTE — PROGRESS NOTES
Subjective   Patient ID: Henri Escobar is a 73 y.o. female who presents for wants to talk about the medications .  Patient presents today stating that she wants a prescription for Ozempic.  She does not have DM and is not morbidly obese clinically although she has refused for some time to be weighed in the office.  She states she understands this and wants Ozempic to treat her anxiety.  I informed her that anxiety is not an indication to prescribe a gastroparetic inducing medication.  She states she understands that as well, but states that all of her siblings as well as herself suffer with anxiety and at a recent get-together, one of her sisters looked very good to her.  She states on discussion with her sister who is also not diabetic or obese, her sister informed her that Ozempic was prescribed to her to treat her anxiety and was working wonderfully.  I reviewed most recent available literature which actually confirms that there is a significant increase in the incidence of depression, anxiety and suicidal ideation in those treated with Ozempic.  It is also interesting to note that while Wegovy reports these adverse events in its PI, Ozempic does not and there are potential legal ramifications pending re:  this issue.    She reports that she is planning on having cataract surgery within the next month or so.        Review of Systems   Constitutional: Negative.    HENT: Negative.     Eyes: Negative.    Respiratory: Negative.     Cardiovascular: Negative.    Gastrointestinal: Negative.    Endocrine: Negative.    Genitourinary: Negative.    Musculoskeletal: Negative.    Skin: Negative.    Allergic/Immunologic: Negative.    Neurological: Negative.    Hematological: Negative.    Psychiatric/Behavioral:  The patient is nervous/anxious.        Objective     Blood pressure 139/82, pulse 71, temperature 35.9 °C (96.7 °F), temperature source Temporal, SpO2 98 %, not currently breastfeeding.   Physical  Exam  Constitutional:       Appearance: Normal appearance.   HENT:      Head: Normocephalic and atraumatic.   Musculoskeletal:      Cervical back: Normal range of motion.   Neurological:      General: No focal deficit present.      Mental Status: She is alert and oriented to person, place, and time.   Psychiatric:         Behavior: Behavior normal.         Assessment/Plan   Problem List Items Addressed This Visit       Anxiety - Primary    DI (diabetes insipidus) (CMS/HCC)    Essential hypertension     See HPI re:  reason for visit and prolonged discussion re:  the reasons I will not inappropriately prescribe Ozempic.  Patient does not appear pleased with my thoughts nor my refusal to prescribe a medication for a condition that it in no way, shape or form is indicated.  She does, however, thank me for my time before she left.  Patient presents in follow up regarding hypertension.  Blood pressure has been well controlled on current therapy.  No adverse effects of medication reported.  Patient denies chest pain, palpitations, shortness of breath, orthopnea, fatigue or edema.  DI Sx remain well compensated on current therapy.  Will continue present therapy and follow.    >20 minutes was spent with the patient today, the majority of which was spent on review of history and medications as well as counselling on care plan and/or coordination of care.    Follow up as scheduled

## 2024-05-03 ENCOUNTER — OFFICE VISIT (OUTPATIENT)
Dept: PRIMARY CARE | Facility: CLINIC | Age: 74
End: 2024-05-03
Payer: MEDICARE

## 2024-05-03 VITALS
DIASTOLIC BLOOD PRESSURE: 82 MMHG | HEIGHT: 65 IN | TEMPERATURE: 97.4 F | WEIGHT: 182 LBS | BODY MASS INDEX: 30.32 KG/M2 | OXYGEN SATURATION: 98 % | HEART RATE: 74 BPM | SYSTOLIC BLOOD PRESSURE: 144 MMHG

## 2024-05-03 DIAGNOSIS — I10 ESSENTIAL HYPERTENSION: ICD-10-CM

## 2024-05-03 DIAGNOSIS — E23.2 DI (DIABETES INSIPIDUS) (MULTI): ICD-10-CM

## 2024-05-03 DIAGNOSIS — Z23 NEED FOR TDAP VACCINATION: Primary | ICD-10-CM

## 2024-05-03 PROCEDURE — 99214 OFFICE O/P EST MOD 30 MIN: CPT | Performed by: FAMILY MEDICINE

## 2024-05-03 PROCEDURE — 1160F RVW MEDS BY RX/DR IN RCRD: CPT | Performed by: FAMILY MEDICINE

## 2024-05-03 PROCEDURE — 3079F DIAST BP 80-89 MM HG: CPT | Performed by: FAMILY MEDICINE

## 2024-05-03 PROCEDURE — 1159F MED LIST DOCD IN RCRD: CPT | Performed by: FAMILY MEDICINE

## 2024-05-03 PROCEDURE — 90471 IMMUNIZATION ADMIN: CPT | Performed by: FAMILY MEDICINE

## 2024-05-03 PROCEDURE — 90715 TDAP VACCINE 7 YRS/> IM: CPT | Performed by: FAMILY MEDICINE

## 2024-05-03 PROCEDURE — 3077F SYST BP >= 140 MM HG: CPT | Performed by: FAMILY MEDICINE

## 2024-05-03 PROCEDURE — 1036F TOBACCO NON-USER: CPT | Performed by: FAMILY MEDICINE

## 2024-05-03 RX ORDER — OLMESARTAN MEDOXOMIL 40 MG/1
40 TABLET ORAL DAILY
Qty: 90 TABLET | Refills: 1 | Status: SHIPPED | OUTPATIENT
Start: 2024-05-03

## 2024-05-03 RX ORDER — DESMOPRESSIN ACETATE 0.1 MG/1
0.25 TABLET ORAL DAILY
Qty: 225 TABLET | Refills: 1 | Status: SHIPPED | OUTPATIENT
Start: 2024-05-03

## 2024-05-03 NOTE — Clinical Note
I forgot to mention during checkout, but is it possible to change patient next appointment to 40 min. She will have many topics to address that can't be handled in 20min. Thanks.

## 2024-05-03 NOTE — PATIENT INSTRUCTIONS
We had a lengthy conversation about ozempic. We agreed that ozempic is not indicated for anxiety but is indicated diabetic patients with the benefit of weight loss in thepat population. We discussed that other medications such as zepound wegovy, saxenda have been approved for weight loss specifically. Pt would like to look into weight management. We discussed diet changes and she was provided information for lifestyle medication. She was encouraged to stay physically active 30min daily. We will check her wait today and advised 5-10% weightloss before next appointment in 3 months. We will discuss weightloss option at that time.  I reviewed her labs, her previous notes, imaging and other reports.

## 2024-05-03 NOTE — PROGRESS NOTES
Assessment     ASSESSMENT/PLAN:      Patient Instructions   We had a lengthy conversation about ozempic. We agreed that ozempic is not indicated for anxiety but is indicated diabetic patients with the benefit of weight loss in thepat population. We discussed that other medications such as zepound wegovy, saxenda have been approved for weight loss specifically. Pt would like to look into weight management. We discussed diet changes and she was provided information for lifestyle medication. She was encouraged to stay physically active 30min daily. We will check her wait today and advised 5-10% weightloss before next appointment in 3 months. We will discuss weightloss option at that time.  I reviewed her labs, her previous notes, imaging and other reports.       Signed by: Xochitl Goyal,        FUTURE DIRECTION:   []    Subjective   SUBJECTIVE:     HPI : Patient is a 73 y.o. female who presents today for the following:     Anxiety  Has tried Wellbutrin in the past however experienced side effects so that was discontinued  Currently on gabapentin 300 mg POM 100mg AM   Clonazepam 0.5mg daily   She is being seen by psychiatrist as well   Pt states that she noticed that her sister who is on ozempic has improved mental state since using ozempic, pt was wondering if this could be a similar outcome for her      HTN  Controlled on olmesartan 40 mg daily    Diabetes insipidus   Currently managed with desmopressin 0.1mg daily     HLD  Has been on statin and zetia but caused back ache     OA   Prinamry OA in bilateral lower extremity managed with collagen supplements     Cataracts   S/P surgery on right eye on 4/26/24, plans to have left eye procedure soon  Next left     Review of Systems    Past Medical History:   Diagnosis Date    Personal history of other diseases of the circulatory system     History of hypertension        Past Surgical History:   Procedure Laterality Date    CATARACT EXTRACTION Right 2024     CHOLECYSTECTOMY      HYSTERECTOMY  02/08/2017    Hysterectomy    NEPHRECTOMY  07/20/2017    Nephrectomy Left    OOPHORECTOMY      TUBAL LIGATION          Current Outpatient Medications   Medication Instructions    ascorbic acid/collagen hydr (COLLAGEN SKIN RENEWAL ORAL) oral, Daily    clonazePAM (KLONOPIN) 0.5 mg, oral, 2 times daily PRN    desmopressin (DDAVP) 0.25 mg, oral, Daily    fexofenadine (Allegra) 180 mg tablet oral, Daily    gabapentin (Neurontin) 300 mg capsule 1 capsule, oral, Nightly    gabapentin (NEURONTIN) 100 mg, oral, Daily    mecobalamin, vitamin B12, 1,000 mcg tablet,chewable oral, Daily    multivitamin with minerals (multivitamin with folic acid) tablet 1 tablet, oral, Daily    olmesartan (BENICAR) 40 mg, oral, Daily        Allergies   Allergen Reactions    Ezetimibe Other    Metoprolol Other and Unknown    Mold Unknown    Omnicef [Cefdinir] Hives    Penicillins Hives    Statins-Hmg-Coa Reductase Inhibitors Other    Cephalosporins Hives, Rash and Unknown    Codeine Hives and Rash        Social History     Socioeconomic History    Marital status:      Spouse name: Not on file    Number of children: Not on file    Years of education: Not on file    Highest education level: Not on file   Occupational History    Not on file   Tobacco Use    Smoking status: Never    Smokeless tobacco: Never   Vaping Use    Vaping status: Never Used   Substance and Sexual Activity    Alcohol use: Never    Drug use: Never    Sexual activity: Not Currently   Other Topics Concern    Not on file   Social History Narrative    Not on file     Social Determinants of Health     Financial Resource Strain: Not on file   Food Insecurity: Not on file   Transportation Needs: Not on file   Physical Activity: Not on file   Stress: Not on file   Social Connections: Not on file   Intimate Partner Violence: Not on file   Housing Stability: Not on file        Family History   Problem Relation Name Age of Onset    Prostate cancer  "Father      Hypertension Father      Angina Father      Hypertension Sister      Hypertension Brother      Diabetes Father's Brother      Stroke Maternal Grandmother      Colon cancer Maternal Grandfather      Breast cancer Other maternal niece         Objective     OBJECTIVE:     Vitals:    05/03/24 0838   BP: 144/82   Pulse: 74   Temp: 36.3 °C (97.4 °F)   SpO2: 98%   Weight: 82.6 kg (182 lb)   Height: 1.645 m (5' 4.75\")        Physical Exam  Constitutional:       Appearance: Normal appearance.   HENT:      Head: Normocephalic.   Pulmonary:      Effort: Pulmonary effort is normal.   Musculoskeletal:      Cervical back: Normal range of motion.   Neurological:      Mental Status: She is alert.   Psychiatric:         Mood and Affect: Mood is anxious.             " Clear

## 2024-05-22 ENCOUNTER — APPOINTMENT (OUTPATIENT)
Dept: PRIMARY CARE | Facility: CLINIC | Age: 74
End: 2024-05-22
Payer: MEDICARE

## 2024-05-30 ENCOUNTER — APPOINTMENT (OUTPATIENT)
Dept: PRIMARY CARE | Facility: CLINIC | Age: 74
End: 2024-05-30
Payer: MEDICARE

## 2024-06-13 ENCOUNTER — LAB (OUTPATIENT)
Dept: LAB | Facility: LAB | Age: 74
End: 2024-06-13
Payer: MEDICARE

## 2024-06-13 DIAGNOSIS — I10 ESSENTIAL HYPERTENSION: ICD-10-CM

## 2024-06-13 DIAGNOSIS — E23.2 DI (DIABETES INSIPIDUS) (MULTI): ICD-10-CM

## 2024-06-13 DIAGNOSIS — C64.9 PRIMARY MALIGNANT NEOPLASM OF KIDNEY WITH METASTASIS FROM KIDNEY TO OTHER SITE, UNSPECIFIED LATERALITY (MULTI): ICD-10-CM

## 2024-06-13 LAB
ALBUMIN SERPL BCP-MCNC: 4.5 G/DL (ref 3.4–5)
ALP SERPL-CCNC: 103 U/L (ref 33–136)
ALT SERPL W P-5'-P-CCNC: 19 U/L (ref 7–45)
ANION GAP SERPL CALC-SCNC: 14 MMOL/L (ref 10–20)
AST SERPL W P-5'-P-CCNC: 12 U/L (ref 9–39)
BASOPHILS # BLD AUTO: 0.06 X10*3/UL (ref 0–0.1)
BASOPHILS NFR BLD AUTO: 0.8 %
BILIRUB SERPL-MCNC: 0.6 MG/DL (ref 0–1.2)
BUN SERPL-MCNC: 18 MG/DL (ref 6–23)
CALCIUM SERPL-MCNC: 9.2 MG/DL (ref 8.6–10.3)
CHLORIDE SERPL-SCNC: 107 MMOL/L (ref 98–107)
CO2 SERPL-SCNC: 23 MMOL/L (ref 21–32)
CREAT SERPL-MCNC: 0.65 MG/DL (ref 0.5–1.05)
EGFRCR SERPLBLD CKD-EPI 2021: >90 ML/MIN/1.73M*2
EOSINOPHIL # BLD AUTO: 0.12 X10*3/UL (ref 0–0.4)
EOSINOPHIL NFR BLD AUTO: 1.7 %
ERYTHROCYTE [DISTWIDTH] IN BLOOD BY AUTOMATED COUNT: 12.5 % (ref 11.5–14.5)
GLUCOSE SERPL-MCNC: 116 MG/DL (ref 74–99)
HCT VFR BLD AUTO: 43.5 % (ref 36–46)
HGB BLD-MCNC: 14.6 G/DL (ref 12–16)
IMM GRANULOCYTES # BLD AUTO: 0.04 X10*3/UL (ref 0–0.5)
IMM GRANULOCYTES NFR BLD AUTO: 0.6 % (ref 0–0.9)
LYMPHOCYTES # BLD AUTO: 2.67 X10*3/UL (ref 0.8–3)
LYMPHOCYTES NFR BLD AUTO: 37.3 %
MCH RBC QN AUTO: 29.9 PG (ref 26–34)
MCHC RBC AUTO-ENTMCNC: 33.6 G/DL (ref 32–36)
MCV RBC AUTO: 89 FL (ref 80–100)
MONOCYTES # BLD AUTO: 0.65 X10*3/UL (ref 0.05–0.8)
MONOCYTES NFR BLD AUTO: 9.1 %
NEUTROPHILS # BLD AUTO: 3.61 X10*3/UL (ref 1.6–5.5)
NEUTROPHILS NFR BLD AUTO: 50.5 %
NRBC BLD-RTO: 0 /100 WBCS (ref 0–0)
PLATELET # BLD AUTO: 360 X10*3/UL (ref 150–450)
POTASSIUM SERPL-SCNC: 4.4 MMOL/L (ref 3.5–5.3)
PROT SERPL-MCNC: 6.8 G/DL (ref 6.4–8.2)
RBC # BLD AUTO: 4.89 X10*6/UL (ref 4–5.2)
SODIUM SERPL-SCNC: 140 MMOL/L (ref 136–145)
WBC # BLD AUTO: 7.2 X10*3/UL (ref 4.4–11.3)

## 2024-06-13 PROCEDURE — 80053 COMPREHEN METABOLIC PANEL: CPT

## 2024-06-13 PROCEDURE — 36415 COLL VENOUS BLD VENIPUNCTURE: CPT

## 2024-06-13 PROCEDURE — 85025 COMPLETE CBC W/AUTO DIFF WBC: CPT

## 2024-06-18 DIAGNOSIS — H91.90 HEARING LOSS, UNSPECIFIED HEARING LOSS TYPE, UNSPECIFIED LATERALITY: Primary | ICD-10-CM

## 2024-07-05 ENCOUNTER — HOSPITAL ENCOUNTER (OUTPATIENT)
Dept: RADIOLOGY | Facility: HOSPITAL | Age: 74
Discharge: HOME | End: 2024-07-05
Payer: MEDICARE

## 2024-07-05 DIAGNOSIS — C64.9 PRIMARY MALIGNANT NEOPLASM OF KIDNEY WITH METASTASIS FROM KIDNEY TO OTHER SITE, UNSPECIFIED LATERALITY (MULTI): ICD-10-CM

## 2024-07-05 PROCEDURE — 74177 CT ABD & PELVIS W/CONTRAST: CPT

## 2024-07-05 PROCEDURE — 2550000001 HC RX 255 CONTRASTS: Performed by: UROLOGY

## 2024-07-15 ENCOUNTER — APPOINTMENT (OUTPATIENT)
Dept: UROLOGY | Facility: CLINIC | Age: 74
End: 2024-07-15
Payer: MEDICARE

## 2024-07-22 ENCOUNTER — APPOINTMENT (OUTPATIENT)
Dept: UROLOGY | Facility: CLINIC | Age: 74
End: 2024-07-22
Payer: MEDICARE

## 2024-07-22 DIAGNOSIS — C64.9 PRIMARY MALIGNANT NEOPLASM OF KIDNEY WITH METASTASIS FROM KIDNEY TO OTHER SITE, UNSPECIFIED LATERALITY (MULTI): Primary | ICD-10-CM

## 2024-07-22 PROCEDURE — 1159F MED LIST DOCD IN RCRD: CPT | Performed by: UROLOGY

## 2024-07-22 PROCEDURE — 99213 OFFICE O/P EST LOW 20 MIN: CPT | Performed by: UROLOGY

## 2024-07-22 NOTE — PROGRESS NOTES
Chief Complaint   Patient presents with    Renal cell carcinoma of kidney     Patient here for yearly follow up to go over CT results, former patient of Dr. Benedict.    Last Visit Plan:  Repeat CT scan of the abdomen and pelvis in July 2024     Follow-up visit with Dr. Mendieta in 1 year      07/22/2024  73-year-old female, status post of left partial nephrectomy, asymptomatic    We discussed recent CT scan result no evidence of recurrence  We discussed the cessation of continued imaging study after 7 years  All the questions were answered, the patient expressed understanding and agreed to the plan.    Impression  Left kidney cancer, status post a partial nephrectomy 2017    Plan  Follow-up as needed     Physical Exam     TODAYS LAB RESULTS:    No Urine sample given    ASSESSMENT&PLAN:      IMPRESSIONS:     CT Abdomen and Pelvis  7/5/2024    No recurrent or metastatic disease in the abdomen or pelvis.     6/29/2023 HBM  Status post left partial nephrectomy for renal cell carcinoma in April 2017 the patient is here today for her 1 year follow-up. A CT scan done in June 2023 shows no evidence of recurrence of the renal cell carcinoma in the left kidney and there is no evidence of regional lymphadenopathy present. The patient asked whether or not it was recommended that she have continued 1 year follow-up I told her although it is unlikely that she would have a recurrent carcinoma it is not 0% that she would not have a recurrence in the future she has elected to have a repeat CT scan in June 2024 and will follow-up with Dr. Mendieta at that time. She is currently asymptomatic she denies any back pain bone or joint pain abdominal pain fever     Surgery  2017 left partial nephrectomy

## 2024-08-01 ENCOUNTER — APPOINTMENT (OUTPATIENT)
Dept: PRIMARY CARE | Facility: CLINIC | Age: 74
End: 2024-08-01
Payer: MEDICARE

## 2024-08-01 VITALS
HEART RATE: 73 BPM | BODY MASS INDEX: 30.16 KG/M2 | HEIGHT: 65 IN | OXYGEN SATURATION: 97 % | TEMPERATURE: 97.6 F | WEIGHT: 181 LBS | SYSTOLIC BLOOD PRESSURE: 134 MMHG | DIASTOLIC BLOOD PRESSURE: 82 MMHG

## 2024-08-01 DIAGNOSIS — I10 ESSENTIAL HYPERTENSION: ICD-10-CM

## 2024-08-01 DIAGNOSIS — R73.01 IFG (IMPAIRED FASTING GLUCOSE): Primary | ICD-10-CM

## 2024-08-01 DIAGNOSIS — E88.810 METABOLIC SYNDROME: ICD-10-CM

## 2024-08-01 DIAGNOSIS — I10 ESSENTIAL (PRIMARY) HYPERTENSION: ICD-10-CM

## 2024-08-01 PROCEDURE — 1036F TOBACCO NON-USER: CPT | Performed by: FAMILY MEDICINE

## 2024-08-01 PROCEDURE — 1159F MED LIST DOCD IN RCRD: CPT | Performed by: FAMILY MEDICINE

## 2024-08-01 PROCEDURE — G0439 PPPS, SUBSEQ VISIT: HCPCS | Performed by: FAMILY MEDICINE

## 2024-08-01 PROCEDURE — 99214 OFFICE O/P EST MOD 30 MIN: CPT | Performed by: FAMILY MEDICINE

## 2024-08-01 PROCEDURE — 3079F DIAST BP 80-89 MM HG: CPT | Performed by: FAMILY MEDICINE

## 2024-08-01 PROCEDURE — 1160F RVW MEDS BY RX/DR IN RCRD: CPT | Performed by: FAMILY MEDICINE

## 2024-08-01 PROCEDURE — 1170F FXNL STATUS ASSESSED: CPT | Performed by: FAMILY MEDICINE

## 2024-08-01 PROCEDURE — 3075F SYST BP GE 130 - 139MM HG: CPT | Performed by: FAMILY MEDICINE

## 2024-08-01 RX ORDER — BUPROPION HYDROCHLORIDE 75 MG/1
TABLET ORAL
COMMUNITY
Start: 2024-06-20

## 2024-08-01 RX ORDER — OLMESARTAN MEDOXOMIL 40 MG/1
40 TABLET ORAL DAILY
Qty: 90 TABLET | Refills: 1 | Status: CANCELLED | OUTPATIENT
Start: 2024-08-01

## 2024-08-01 ASSESSMENT — ACTIVITIES OF DAILY LIVING (ADL)
BATHING: INDEPENDENT
DRESSING: INDEPENDENT
TAKING_MEDICATION: INDEPENDENT
GROCERY_SHOPPING: INDEPENDENT
DOING_HOUSEWORK: INDEPENDENT
MANAGING_FINANCES: INDEPENDENT

## 2024-08-01 ASSESSMENT — PATIENT HEALTH QUESTIONNAIRE - PHQ9
1. LITTLE INTEREST OR PLEASURE IN DOING THINGS: NOT AT ALL
SUM OF ALL RESPONSES TO PHQ9 QUESTIONS 1 AND 2: 0
2. FEELING DOWN, DEPRESSED OR HOPELESS: NOT AT ALL

## 2024-08-01 NOTE — PATIENT INSTRUCTIONS
IFG/Metabolic syndrome: Pt is class 1 obesity with metabolic syndrome.  Despite strict diet and staying active her blood sugar elevated and weight loss is negligible. I believe she would be a good candidate for semaglutide 0.25 weekly. Pt plans on increasing protein consumption and beginning strength training

## 2024-08-01 NOTE — Clinical Note
Reynaldo Edwards,   Would you be okay seeing this patient in the future. She has anxiety, HTN , HLD, and diabetes insipidus. We are currently working on weightloss. I believe she would be a positive addition to your panel if you would allow it.

## 2024-10-21 ENCOUNTER — LAB (OUTPATIENT)
Dept: LAB | Facility: LAB | Age: 74
End: 2024-10-21
Payer: MEDICARE

## 2024-10-21 DIAGNOSIS — E88.810 METABOLIC SYNDROME: ICD-10-CM

## 2024-10-21 DIAGNOSIS — R73.01 IFG (IMPAIRED FASTING GLUCOSE): ICD-10-CM

## 2024-10-21 DIAGNOSIS — I10 ESSENTIAL (PRIMARY) HYPERTENSION: ICD-10-CM

## 2024-10-21 LAB
ANION GAP SERPL CALC-SCNC: 11 MMOL/L (ref 10–20)
BUN SERPL-MCNC: 20 MG/DL (ref 6–23)
CALCIUM SERPL-MCNC: 8.7 MG/DL (ref 8.6–10.3)
CHLORIDE SERPL-SCNC: 106 MMOL/L (ref 98–107)
CHOLEST SERPL-MCNC: 237 MG/DL (ref 0–199)
CHOLESTEROL/HDL RATIO: 6.6
CO2 SERPL-SCNC: 26 MMOL/L (ref 21–32)
CREAT SERPL-MCNC: 0.63 MG/DL (ref 0.5–1.05)
EGFRCR SERPLBLD CKD-EPI 2021: >90 ML/MIN/1.73M*2
EST. AVERAGE GLUCOSE BLD GHB EST-MCNC: 120 MG/DL
GLUCOSE SERPL-MCNC: 108 MG/DL (ref 74–99)
HBA1C MFR BLD: 5.8 %
HDLC SERPL-MCNC: 35.8 MG/DL
LDLC SERPL CALC-MCNC: 163 MG/DL
NON HDL CHOLESTEROL: 201 MG/DL (ref 0–149)
POTASSIUM SERPL-SCNC: 4.4 MMOL/L (ref 3.5–5.3)
SODIUM SERPL-SCNC: 139 MMOL/L (ref 136–145)
TRIGL SERPL-MCNC: 191 MG/DL (ref 0–149)
VLDL: 38 MG/DL (ref 0–40)

## 2024-10-21 PROCEDURE — 83036 HEMOGLOBIN GLYCOSYLATED A1C: CPT

## 2024-10-21 PROCEDURE — 80061 LIPID PANEL: CPT

## 2024-10-21 PROCEDURE — 80048 BASIC METABOLIC PNL TOTAL CA: CPT

## 2024-10-21 PROCEDURE — 36415 COLL VENOUS BLD VENIPUNCTURE: CPT

## 2024-11-01 ENCOUNTER — APPOINTMENT (OUTPATIENT)
Dept: PRIMARY CARE | Facility: CLINIC | Age: 74
End: 2024-11-01
Payer: MEDICARE

## 2024-11-01 ENCOUNTER — OFFICE VISIT (OUTPATIENT)
Dept: PRIMARY CARE | Facility: CLINIC | Age: 74
End: 2024-11-01
Payer: MEDICARE

## 2024-11-01 VITALS
DIASTOLIC BLOOD PRESSURE: 90 MMHG | TEMPERATURE: 96.6 F | WEIGHT: 184.4 LBS | BODY MASS INDEX: 30.69 KG/M2 | HEART RATE: 77 BPM | OXYGEN SATURATION: 98 % | SYSTOLIC BLOOD PRESSURE: 150 MMHG

## 2024-11-01 DIAGNOSIS — I10 ESSENTIAL HYPERTENSION: ICD-10-CM

## 2024-11-01 DIAGNOSIS — L30.4 INTERTRIGO: ICD-10-CM

## 2024-11-01 DIAGNOSIS — E78.00 HYPERCHOLESTEROLEMIA: ICD-10-CM

## 2024-11-01 DIAGNOSIS — E23.2 DI (DIABETES INSIPIDUS): Primary | ICD-10-CM

## 2024-11-01 DIAGNOSIS — E88.810 METABOLIC SYNDROME: ICD-10-CM

## 2024-11-01 DIAGNOSIS — Z85.528 HISTORY OF RENAL CELL CARCINOMA: ICD-10-CM

## 2024-11-01 DIAGNOSIS — R73.03 PREDIABETES: ICD-10-CM

## 2024-11-01 PROBLEM — R73.01 IFG (IMPAIRED FASTING GLUCOSE): Status: ACTIVE | Noted: 2024-11-01

## 2024-11-01 PROBLEM — Z00.00 MEDICARE ANNUAL WELLNESS VISIT, SUBSEQUENT: Status: RESOLVED | Noted: 2023-06-07 | Resolved: 2024-11-01

## 2024-11-01 PROCEDURE — G2211 COMPLEX E/M VISIT ADD ON: HCPCS | Performed by: FAMILY MEDICINE

## 2024-11-01 PROCEDURE — 1036F TOBACCO NON-USER: CPT | Performed by: FAMILY MEDICINE

## 2024-11-01 PROCEDURE — 1159F MED LIST DOCD IN RCRD: CPT | Performed by: FAMILY MEDICINE

## 2024-11-01 PROCEDURE — 99215 OFFICE O/P EST HI 40 MIN: CPT | Performed by: FAMILY MEDICINE

## 2024-11-01 PROCEDURE — 3080F DIAST BP >= 90 MM HG: CPT | Performed by: FAMILY MEDICINE

## 2024-11-01 PROCEDURE — 3077F SYST BP >= 140 MM HG: CPT | Performed by: FAMILY MEDICINE

## 2024-11-01 PROCEDURE — 1160F RVW MEDS BY RX/DR IN RCRD: CPT | Performed by: FAMILY MEDICINE

## 2024-11-01 RX ORDER — DESMOPRESSIN ACETATE 0.1 MG/1
0.25 TABLET ORAL DAILY
Qty: 225 TABLET | Refills: 1 | Status: CANCELLED | OUTPATIENT
Start: 2024-11-01

## 2024-11-01 RX ORDER — OLMESARTAN MEDOXOMIL 40 MG/1
40 TABLET ORAL DAILY
Qty: 90 TABLET | Refills: 1 | Status: SHIPPED | OUTPATIENT
Start: 2024-11-01

## 2024-11-01 RX ORDER — CLOTRIMAZOLE 1 %
CREAM (GRAM) TOPICAL 2 TIMES DAILY
Qty: 30 G | Refills: 1 | Status: SHIPPED | OUTPATIENT
Start: 2024-11-01

## 2024-11-01 RX ORDER — ALIROCUMAB 75 MG/ML
75 INJECTION, SOLUTION SUBCUTANEOUS
Qty: 2 ML | Refills: 2 | Status: SHIPPED | OUTPATIENT
Start: 2024-11-01

## 2024-11-01 RX ORDER — EVOLOCUMAB 140 MG/ML
140 INJECTION, SOLUTION SUBCUTANEOUS
Qty: 2 EACH | Refills: 2 | Status: SHIPPED | OUTPATIENT
Start: 2024-11-01 | End: 2024-11-01

## 2024-11-01 RX ORDER — DESMOPRESSIN ACETATE 0.1 MG/1
0.1 TABLET ORAL 3 TIMES DAILY
Qty: 225 TABLET | Refills: 1 | Status: SHIPPED | OUTPATIENT
Start: 2024-11-01

## 2024-11-01 ASSESSMENT — ENCOUNTER SYMPTOMS
FEVER: 0
COUGH: 0
CHILLS: 0
ABDOMINAL PAIN: 0
DIARRHEA: 0
NAUSEA: 0
SHORTNESS OF BREATH: 0
VOMITING: 0
DYSURIA: 0
NERVOUS/ANXIOUS: 1

## 2024-11-04 ENCOUNTER — TELEPHONE (OUTPATIENT)
Dept: PRIMARY CARE | Facility: CLINIC | Age: 74
End: 2024-11-04
Payer: MEDICARE

## 2024-11-04 DIAGNOSIS — E78.00 HYPERCHOLESTEROLEMIA: ICD-10-CM

## 2024-11-04 RX ORDER — ALIROCUMAB 75 MG/ML
75 INJECTION, SOLUTION SUBCUTANEOUS
Qty: 2 ML | Refills: 2 | Status: SHIPPED | OUTPATIENT
Start: 2024-11-04

## 2024-11-04 NOTE — TELEPHONE ENCOUNTER
Called and spoke with pt, Per pt she just spoke with mailorder company, they will cover this new med, but this needs sent to local pharm. They will not send this to pt, they do not have these types of medications in stock. Please send to [facility]. Thanks. JW

## 2024-11-04 NOTE — TELEPHONE ENCOUNTER
Pt went to  Repatha but pharm told her rx was canceled by [employer]. Pt asking why? She was supposed to pick this up.

## 2024-11-04 NOTE — TELEPHONE ENCOUNTER
Let her know that we had received a message from her insurance that the Repatha was not going to be covered.  If the Repatha was covered and at an affordable price, I can resend the medication.  Because we received a message that it was not going to be covered, I sent in a different medication called Praluent.

## 2024-11-04 NOTE — TELEPHONE ENCOUNTER
"Pt wants to let [facility] also know she got a message from her \"provider\" saying the rx was approved... ?  "

## 2024-12-23 ENCOUNTER — TELEPHONE (OUTPATIENT)
Dept: PRIMARY CARE | Facility: CLINIC | Age: 74
End: 2024-12-23
Payer: MEDICARE

## 2025-01-06 ENCOUNTER — TELEPHONE (OUTPATIENT)
Dept: PRIMARY CARE | Facility: CLINIC | Age: 75
End: 2025-01-06
Payer: MEDICARE

## 2025-01-06 NOTE — TELEPHONE ENCOUNTER
Pt is going out of town to [place] and will not be back until Feb 5th. Pt is asking for advice on what to do, pt has the proper packaging to keep her second injection of praluent cold. Pt will be taking this Jan 20th, however, pt is supposed to administer another injection Feb 3rd, she is looking to see what she needs to do for this as she won't be in town to  next rf of this rx.

## 2025-01-07 NOTE — TELEPHONE ENCOUNTER
Informed pt of j message, she's asking now if it is okay to get rf on 2/5 when she is back from her trip? Is waiting 2 days okay?/oli

## 2025-01-07 NOTE — TELEPHONE ENCOUNTER
Praluent can be kept at room temperature for up to 30 days. When is she leaving for the trip? If it is within 30 days of 2/3, she can take both injections with her. Alternatively, I could send a refill to a pharmacy where she is traveling.

## 2025-01-07 NOTE — TELEPHONE ENCOUNTER
Ok to wait two days. May impact efficacy a little bit but likely not enough to be clinically significant

## 2025-01-09 ENCOUNTER — OFFICE VISIT (OUTPATIENT)
Dept: URGENT CARE | Age: 75
End: 2025-01-09
Payer: MEDICARE

## 2025-01-09 VITALS
SYSTOLIC BLOOD PRESSURE: 152 MMHG | OXYGEN SATURATION: 100 % | HEART RATE: 85 BPM | TEMPERATURE: 98.4 F | DIASTOLIC BLOOD PRESSURE: 86 MMHG

## 2025-01-09 DIAGNOSIS — E23.2 DI (DIABETES INSIPIDUS): ICD-10-CM

## 2025-01-09 DIAGNOSIS — I10 ESSENTIAL HYPERTENSION: ICD-10-CM

## 2025-01-09 DIAGNOSIS — R35.0 URINARY FREQUENCY: Primary | ICD-10-CM

## 2025-01-09 LAB
POC APPEARANCE, URINE: CLEAR
POC BILIRUBIN, URINE: NEGATIVE
POC BLOOD, URINE: ABNORMAL
POC COLOR, URINE: YELLOW
POC GLUCOSE, URINE: NEGATIVE MG/DL
POC KETONES, URINE: NEGATIVE MG/DL
POC LEUKOCYTES, URINE: NEGATIVE
POC NITRITE,URINE: NEGATIVE
POC PH, URINE: 6 PH
POC PROTEIN, URINE: NEGATIVE MG/DL
POC SPECIFIC GRAVITY, URINE: >=1.03
POC UROBILINOGEN, URINE: 0.2 EU/DL

## 2025-01-09 PROCEDURE — 3079F DIAST BP 80-89 MM HG: CPT | Performed by: NURSE PRACTITIONER

## 2025-01-09 PROCEDURE — 1159F MED LIST DOCD IN RCRD: CPT | Performed by: NURSE PRACTITIONER

## 2025-01-09 PROCEDURE — 81003 URINALYSIS AUTO W/O SCOPE: CPT | Performed by: NURSE PRACTITIONER

## 2025-01-09 PROCEDURE — 1036F TOBACCO NON-USER: CPT | Performed by: NURSE PRACTITIONER

## 2025-01-09 PROCEDURE — 87086 URINE CULTURE/COLONY COUNT: CPT

## 2025-01-09 PROCEDURE — 3077F SYST BP >= 140 MM HG: CPT | Performed by: NURSE PRACTITIONER

## 2025-01-09 PROCEDURE — 99203 OFFICE O/P NEW LOW 30 MIN: CPT | Performed by: NURSE PRACTITIONER

## 2025-01-09 NOTE — PROGRESS NOTES
SUBJECTIVE: Sima Escobar is a 74 y.o. female who complains of urinary frequency, urgency and dysuria x 1 days, without flank pain, fever, chills, or abnormal vaginal discharge or bleeding.     OBJECTIVE: Appears well, in no apparent distress.  Vital signs are normal. The abdomen is soft without tenderness, guarding, mass, rebound or organomegaly. No CVA tenderness or inguinal adenopathy noted. Urine dipstick shows positive for WBC's.  Micro exam: sent    ASSESSMENT: urinary frequency  Diabetes insipidus  hypertension    PLAN:  Sending urine for culture, will notify if antibiotic needed for UTI  Increase hydration  Avoid bladder irritants: Alcohol, caffeine, soda  Can take AZO for painful urination-is OTC  Go to ER inability to urinate, abdominal pain, back pain, fever, chills  Follow up with PCP    Also advised that increased urinary frequency may be d/t diabetes insipitus, needs to call PCP before leaving for [place] on Saturday  Elevated blood pressure.  Should monitor BP at home, if remaining >140/80 contact PCP.  If >180/80 go to ER.  Avoid products with pseudoephedrine and NSAIDS (Ibuprofen, Alleve).  Important to take routine prescribed medication

## 2025-01-09 NOTE — PATIENT INSTRUCTIONS
Sending urine for culture, will notify if antibiotic needed for UTI  Increase hydration  Avoid bladder irritants: Alcohol, caffeine, soda  Can take AZO for painful urination-is OTC  Go to ER inability to urinate, abdominal pain, back pain, fever, chills  Follow up with PCP    Also advised that increased urinary frequency may be d/t diabetes insipitus, needs to call PCP before leaving for [place] on Saturday  Elevated blood pressure.  Should monitor BP at home, if remaining >140/80 contact PCP.  If >180/80 go to ER.  Avoid products with pseudoephedrine and NSAIDS (Ibuprofen, Alleve).  Important to take routine prescribed medication

## 2025-01-10 ENCOUNTER — APPOINTMENT (OUTPATIENT)
Dept: CARDIOLOGY | Facility: HOSPITAL | Age: 75
End: 2025-01-10
Payer: MEDICARE

## 2025-01-10 ENCOUNTER — HOSPITAL ENCOUNTER (EMERGENCY)
Facility: HOSPITAL | Age: 75
Discharge: HOME | End: 2025-01-10
Attending: STUDENT IN AN ORGANIZED HEALTH CARE EDUCATION/TRAINING PROGRAM
Payer: MEDICARE

## 2025-01-10 VITALS
BODY MASS INDEX: 30.73 KG/M2 | OXYGEN SATURATION: 98 % | DIASTOLIC BLOOD PRESSURE: 89 MMHG | RESPIRATION RATE: 16 BRPM | HEART RATE: 76 BPM | SYSTOLIC BLOOD PRESSURE: 143 MMHG | TEMPERATURE: 97.2 F | WEIGHT: 180 LBS | HEIGHT: 64 IN

## 2025-01-10 DIAGNOSIS — G44.209 ACUTE NON INTRACTABLE TENSION-TYPE HEADACHE: ICD-10-CM

## 2025-01-10 DIAGNOSIS — Z86.39 HISTORY OF DIABETES INSIPIDUS: ICD-10-CM

## 2025-01-10 DIAGNOSIS — N30.90 CYSTITIS: Primary | ICD-10-CM

## 2025-01-10 LAB
ALBUMIN SERPL BCP-MCNC: 4.3 G/DL (ref 3.4–5)
ALP SERPL-CCNC: 99 U/L (ref 33–136)
ALT SERPL W P-5'-P-CCNC: 18 U/L (ref 7–45)
ANION GAP SERPL CALC-SCNC: 12 MMOL/L (ref 10–20)
APPEARANCE UR: CLEAR
AST SERPL W P-5'-P-CCNC: 18 U/L (ref 9–39)
ATRIAL RATE: 72 BPM
BASOPHILS # BLD AUTO: 0.06 X10*3/UL (ref 0–0.1)
BASOPHILS NFR BLD AUTO: 0.6 %
BILIRUB SERPL-MCNC: 0.5 MG/DL (ref 0–1.2)
BILIRUB UR STRIP.AUTO-MCNC: NEGATIVE MG/DL
BNP SERPL-MCNC: 67 PG/ML (ref 0–99)
BUN SERPL-MCNC: 15 MG/DL (ref 6–23)
CALCIUM SERPL-MCNC: 8.9 MG/DL (ref 8.6–10.3)
CARDIAC TROPONIN I PNL SERPL HS: 3 NG/L (ref 0–13)
CHLORIDE SERPL-SCNC: 106 MMOL/L (ref 98–107)
CO2 SERPL-SCNC: 23 MMOL/L (ref 21–32)
COLOR UR: ABNORMAL
CREAT SERPL-MCNC: 0.67 MG/DL (ref 0.5–1.05)
CREAT UR-MCNC: 126.4 MG/DL (ref 20–320)
EGFRCR SERPLBLD CKD-EPI 2021: >90 ML/MIN/1.73M*2
EOSINOPHIL # BLD AUTO: 0.15 X10*3/UL (ref 0–0.4)
EOSINOPHIL NFR BLD AUTO: 1.5 %
ERYTHROCYTE [DISTWIDTH] IN BLOOD BY AUTOMATED COUNT: 12.1 % (ref 11.5–14.5)
GLUCOSE SERPL-MCNC: 111 MG/DL (ref 74–99)
GLUCOSE UR STRIP.AUTO-MCNC: NORMAL MG/DL
HCT VFR BLD AUTO: 42.3 % (ref 36–46)
HGB BLD-MCNC: 14.7 G/DL (ref 12–16)
HOLD SPECIMEN: NORMAL
HYALINE CASTS #/AREA URNS AUTO: ABNORMAL /LPF
IMM GRANULOCYTES # BLD AUTO: 0.03 X10*3/UL (ref 0–0.5)
IMM GRANULOCYTES NFR BLD AUTO: 0.3 % (ref 0–0.9)
KETONES UR STRIP.AUTO-MCNC: NEGATIVE MG/DL
LEUKOCYTE ESTERASE UR QL STRIP.AUTO: ABNORMAL
LYMPHOCYTES # BLD AUTO: 2.7 X10*3/UL (ref 0.8–3)
LYMPHOCYTES NFR BLD AUTO: 26.6 %
MAGNESIUM SERPL-MCNC: 1.89 MG/DL (ref 1.6–2.4)
MCH RBC QN AUTO: 31.1 PG (ref 26–34)
MCHC RBC AUTO-ENTMCNC: 34.8 G/DL (ref 32–36)
MCV RBC AUTO: 89 FL (ref 80–100)
MONOCYTES # BLD AUTO: 0.81 X10*3/UL (ref 0.05–0.8)
MONOCYTES NFR BLD AUTO: 8 %
MUCOUS THREADS #/AREA URNS AUTO: ABNORMAL /LPF
NEUTROPHILS # BLD AUTO: 6.41 X10*3/UL (ref 1.6–5.5)
NEUTROPHILS NFR BLD AUTO: 63 %
NITRITE UR QL STRIP.AUTO: NEGATIVE
NRBC BLD-RTO: 0 /100 WBCS (ref 0–0)
OSMOLALITY UR: 574 MOSM/KG (ref 200–1200)
P AXIS: 63 DEGREES
PH UR STRIP.AUTO: 6 [PH]
PHOSPHATE SERPL-MCNC: 2.8 MG/DL (ref 2.5–4.9)
PLATELET # BLD AUTO: 301 X10*3/UL (ref 150–450)
POTASSIUM SERPL-SCNC: 4.3 MMOL/L (ref 3.5–5.3)
PR INTERVAL: 152 MS
PROT SERPL-MCNC: 6.6 G/DL (ref 6.4–8.2)
PROT UR STRIP.AUTO-MCNC: NEGATIVE MG/DL
Q ONSET: 251 MS
QRS COUNT: 12 BEATS
QRS DURATION: 75 MS
QT INTERVAL: 476 MS
QTC CALCULATION(BAZETT): 522 MS
QTC FREDERICIA: 506 MS
R AXIS: 68 DEGREES
RBC # BLD AUTO: 4.73 X10*6/UL (ref 4–5.2)
RBC # UR STRIP.AUTO: NEGATIVE /UL
RBC #/AREA URNS AUTO: ABNORMAL /HPF
SODIUM SERPL-SCNC: 137 MMOL/L (ref 136–145)
SODIUM UR-SCNC: 49 MMOL/L
SODIUM/CREAT UR-RTO: 39 MMOL/G CREAT
SP GR UR STRIP.AUTO: 1.02
SQUAMOUS #/AREA URNS AUTO: ABNORMAL /HPF
T AXIS: -58 DEGREES
T OFFSET: 489 MS
UROBILINOGEN UR STRIP.AUTO-MCNC: NORMAL MG/DL
VENTRICULAR RATE: 72 BPM
WBC # BLD AUTO: 10.2 X10*3/UL (ref 4.4–11.3)
WBC #/AREA URNS AUTO: ABNORMAL /HPF

## 2025-01-10 PROCEDURE — 80053 COMPREHEN METABOLIC PANEL: CPT | Performed by: STUDENT IN AN ORGANIZED HEALTH CARE EDUCATION/TRAINING PROGRAM

## 2025-01-10 PROCEDURE — 83880 ASSAY OF NATRIURETIC PEPTIDE: CPT | Performed by: STUDENT IN AN ORGANIZED HEALTH CARE EDUCATION/TRAINING PROGRAM

## 2025-01-10 PROCEDURE — 83935 ASSAY OF URINE OSMOLALITY: CPT | Mod: PORLAB | Performed by: STUDENT IN AN ORGANIZED HEALTH CARE EDUCATION/TRAINING PROGRAM

## 2025-01-10 PROCEDURE — 81003 URINALYSIS AUTO W/O SCOPE: CPT | Performed by: STUDENT IN AN ORGANIZED HEALTH CARE EDUCATION/TRAINING PROGRAM

## 2025-01-10 PROCEDURE — 99284 EMERGENCY DEPT VISIT MOD MDM: CPT | Performed by: STUDENT IN AN ORGANIZED HEALTH CARE EDUCATION/TRAINING PROGRAM

## 2025-01-10 PROCEDURE — 87086 URINE CULTURE/COLONY COUNT: CPT | Mod: PORLAB | Performed by: STUDENT IN AN ORGANIZED HEALTH CARE EDUCATION/TRAINING PROGRAM

## 2025-01-10 PROCEDURE — 84484 ASSAY OF TROPONIN QUANT: CPT | Performed by: STUDENT IN AN ORGANIZED HEALTH CARE EDUCATION/TRAINING PROGRAM

## 2025-01-10 PROCEDURE — 82570 ASSAY OF URINE CREATININE: CPT | Performed by: STUDENT IN AN ORGANIZED HEALTH CARE EDUCATION/TRAINING PROGRAM

## 2025-01-10 PROCEDURE — 2500000002 HC RX 250 W HCPCS SELF ADMINISTERED DRUGS (ALT 637 FOR MEDICARE OP, ALT 636 FOR OP/ED): Performed by: STUDENT IN AN ORGANIZED HEALTH CARE EDUCATION/TRAINING PROGRAM

## 2025-01-10 PROCEDURE — 85025 COMPLETE CBC W/AUTO DIFF WBC: CPT | Performed by: STUDENT IN AN ORGANIZED HEALTH CARE EDUCATION/TRAINING PROGRAM

## 2025-01-10 PROCEDURE — 83735 ASSAY OF MAGNESIUM: CPT | Performed by: STUDENT IN AN ORGANIZED HEALTH CARE EDUCATION/TRAINING PROGRAM

## 2025-01-10 PROCEDURE — 36415 COLL VENOUS BLD VENIPUNCTURE: CPT | Performed by: STUDENT IN AN ORGANIZED HEALTH CARE EDUCATION/TRAINING PROGRAM

## 2025-01-10 PROCEDURE — 93005 ELECTROCARDIOGRAM TRACING: CPT

## 2025-01-10 PROCEDURE — 2500000001 HC RX 250 WO HCPCS SELF ADMINISTERED DRUGS (ALT 637 FOR MEDICARE OP): Performed by: STUDENT IN AN ORGANIZED HEALTH CARE EDUCATION/TRAINING PROGRAM

## 2025-01-10 PROCEDURE — 84100 ASSAY OF PHOSPHORUS: CPT | Performed by: STUDENT IN AN ORGANIZED HEALTH CARE EDUCATION/TRAINING PROGRAM

## 2025-01-10 PROCEDURE — 2500000004 HC RX 250 GENERAL PHARMACY W/ HCPCS (ALT 636 FOR OP/ED): Performed by: STUDENT IN AN ORGANIZED HEALTH CARE EDUCATION/TRAINING PROGRAM

## 2025-01-10 RX ORDER — NITROFURANTOIN 25; 75 MG/1; MG/1
100 CAPSULE ORAL 2 TIMES DAILY
Qty: 10 CAPSULE | Refills: 0 | Status: SHIPPED | OUTPATIENT
Start: 2025-01-10 | End: 2025-01-15

## 2025-01-10 RX ORDER — ACETAMINOPHEN 325 MG/1
650 TABLET ORAL ONCE
Status: COMPLETED | OUTPATIENT
Start: 2025-01-10 | End: 2025-01-10

## 2025-01-10 RX ORDER — NITROFURANTOIN 25; 75 MG/1; MG/1
100 CAPSULE ORAL ONCE
Status: COMPLETED | OUTPATIENT
Start: 2025-01-10 | End: 2025-01-10

## 2025-01-10 RX ORDER — LANOLIN ALCOHOL/MO/W.PET/CERES
400 CREAM (GRAM) TOPICAL ONCE
Status: COMPLETED | OUTPATIENT
Start: 2025-01-10 | End: 2025-01-10

## 2025-01-10 RX ADMIN — ACETAMINOPHEN 650 MG: 325 TABLET ORAL at 05:53

## 2025-01-10 RX ADMIN — Medication 400 MG: at 05:52

## 2025-01-10 RX ADMIN — NITROFURANTOIN (MONOHYDRATE/MACROCRYSTALS) 100 MG: 25; 75 CAPSULE ORAL at 05:53

## 2025-01-10 ASSESSMENT — LIFESTYLE VARIABLES
HAVE PEOPLE ANNOYED YOU BY CRITICIZING YOUR DRINKING: NO
TOTAL SCORE: 0
EVER HAD A DRINK FIRST THING IN THE MORNING TO STEADY YOUR NERVES TO GET RID OF A HANGOVER: NO
HAVE YOU EVER FELT YOU SHOULD CUT DOWN ON YOUR DRINKING: NO
EVER FELT BAD OR GUILTY ABOUT YOUR DRINKING: NO

## 2025-01-10 ASSESSMENT — PAIN - FUNCTIONAL ASSESSMENT: PAIN_FUNCTIONAL_ASSESSMENT: 0-10

## 2025-01-10 NOTE — ED PROVIDER NOTES
HPI   Chief Complaint   Patient presents with    Hypertension     Pt c/o high blood pressure yesterday and this morning, as well as intermittent HA x approx 1 week. Hx Diabetes Insipidus, HTN, hyperlipidemia.       HPI  74-year-old female with history of diabetes insipidus on desmopressin presents with concern for high blood pressure.  She states she was seen at urgent care yesterday for concern for urinary tract infection, and while at urgent care they took her blood pressure which she noted was 160 systolic.  She states she has been taking her desmopressin as prescribed.  She reports she is concerned that taking extra desmopressin may increase her blood pressure, although she states she wanted to increase her desmopressin because she was having some increased urinary frequency.  She has had a mild diffuse headache for approximately 1 week.  She states she is anxious about her blood pressure as she is going on a trip tomorrow and wants to make sure she is okay beforehand.  She denies changes in vision, lightheadedness, dizziness, syncope, numbness, weakness, slurred speech, numbness, tingling, weakness, chest pain, palpitations, shortness of breath, abdominal pain, nausea, vomiting, diarrhea, hematuria, flank pain.      Patient History   Past Medical History:   Diagnosis Date    Personal history of other diseases of the circulatory system     History of hypertension     Past Surgical History:   Procedure Laterality Date    CATARACT EXTRACTION Right 2024    CHOLECYSTECTOMY      HYSTERECTOMY  02/08/2017    Hysterectomy    OOPHORECTOMY      PARTIAL NEPHRECTOMY Left     for cancer    TUBAL LIGATION       Family History   Problem Relation Name Age of Onset    Prostate cancer Father      Hypertension Father      Angina Father      Hypertension Sister      Hypertension Brother      Diabetes Father's Brother      Stroke Maternal Grandmother      Colon cancer Maternal Grandfather      Breast cancer Other maternal niece       Social History     Tobacco Use    Smoking status: Never    Smokeless tobacco: Never   Vaping Use    Vaping status: Never Used   Substance Use Topics    Alcohol use: Never    Drug use: Never       Physical Exam   ED Triage Vitals [01/10/25 0353]   Temperature Heart Rate Respirations BP   36.2 °C (97.2 °F) 80 20 (!) 182/107      Pulse Ox Temp Source Heart Rate Source Patient Position   97 % Temporal -- --      BP Location FiO2 (%)     -- --       Physical Exam  Vitals reviewed.   Constitutional:       Appearance: She is not toxic-appearing.   HENT:      Mouth/Throat:      Pharynx: Oropharynx is clear.   Eyes:      Pupils: Pupils are equal, round, and reactive to light.   Cardiovascular:      Rate and Rhythm: Normal rate and regular rhythm.   Pulmonary:      Effort: Pulmonary effort is normal.      Breath sounds: Normal breath sounds. No stridor. No wheezing, rhonchi or rales.   Abdominal:      Palpations: Abdomen is soft.      Tenderness: There is no abdominal tenderness. There is no guarding or rebound.   Musculoskeletal:         General: Normal range of motion.      Cervical back: Normal range of motion.   Skin:     General: Skin is warm and dry.   Neurological:      General: No focal deficit present.      Mental Status: She is alert and oriented to person, place, and time.           ED Course & MDM   ED Course as of 01/10/25 0546   Fri Rosales 10, 2025   0436 EKG, interpreted by me: Sinus rhythm, rate 72 bpm.  Normal axis.  No acute ST elevation or depression.  T wave inversions V5 V6, isolated.  QTc 522. [JG]   0539 UA yesterday was negative for leukocyte esterase and WBC. Although today's sample is not definitely representing cystitis, given patient's reported dysuria and increased frequency in the setting of apparently well controlled DI and with a change from yesterday with patient about to go out of town without follow up, will start antibiotics. Denying headache at this time, BP well controlled without  intervention. Recommend close follow up with PCP. Strict return precautions. Patient ambulating with a steady gait without assistance. Voiced understanding and agreement with plan. [JG]      ED Course User Index  [JG] Kayli Guevara MD         Diagnoses as of 01/10/25 0546   Cystitis   Acute non intractable tension-type headache   History of diabetes insipidus                 No data recorded     [place] Coma Scale Score: 15 (01/10/25 0355 : Dianelys Waddell RN)                       Medical Decision Making  74-year-old female with history of diabetes insipidus on desmopressin presents with concern for high blood pressure and dysuria. Had a headache for the past few days that has significantly improved, denies headache on my exam currently. BP on arrival elevated, however repeat without intervention was systolic 159 and subsequently 148. No focal neurologic deficits. Lab workup shows no evidence of worsening DI, normal serum and urine sodium and creatinine, specific gravity of urine within normal limits. Remainder of workup including troponins, electrolytes, BNP unremarkable. QTC is prolonged at 522. Given magnesium. UA yesterday was negative for leukocyte esterase and WBC. Although today's sample is not definitely representing cystitis, given patient's reported dysuria and increased frequency in the setting of apparently well controlled DI and with a change from yesterday with patient about to go out of town without immediate follow up, will start antibiotics. Denying headache at this time, BP well controlled without intervention. Recommend close follow up with PCP, who she states she will see as soon as she returns from her trip. Strict return precautions discussed. Patient ambulating with a steady gait without assistance. Voiced understanding and agreement with plan    Procedure  Procedures     Kayli Guevara MD  01/10/25 0551

## 2025-01-11 LAB
BACTERIA UR CULT: NORMAL
BACTERIA UR CULT: NORMAL

## 2025-01-19 LAB
ATRIAL RATE: 72 BPM
P AXIS: 63 DEGREES
PR INTERVAL: 152 MS
Q ONSET: 251 MS
QRS COUNT: 12 BEATS
QRS DURATION: 75 MS
QT INTERVAL: 476 MS
QTC CALCULATION(BAZETT): 522 MS
QTC FREDERICIA: 506 MS
R AXIS: 68 DEGREES
T AXIS: -58 DEGREES
T OFFSET: 489 MS
VENTRICULAR RATE: 72 BPM

## 2025-01-21 DIAGNOSIS — E78.00 HYPERCHOLESTEROLEMIA: ICD-10-CM

## 2025-01-21 RX ORDER — ALIROCUMAB 75 MG/ML
75 INJECTION, SOLUTION SUBCUTANEOUS
Qty: 2 ML | Refills: 2 | Status: SHIPPED | OUTPATIENT
Start: 2025-01-21

## 2025-01-21 NOTE — TELEPHONE ENCOUNTER
Pt called Rx line asking for a refill on pended medication med sent in Nov with 2 refills. Next OV 2/20/25  Please adviseJAVI

## 2025-02-11 LAB
ALBUMIN SERPL-MCNC: 4.5 G/DL (ref 3.6–5.1)
ALP SERPL-CCNC: 90 U/L (ref 37–153)
ALT SERPL-CCNC: 17 U/L (ref 6–29)
ANION GAP SERPL CALCULATED.4IONS-SCNC: 10 MMOL/L (CALC) (ref 7–17)
APPEARANCE UR: CLEAR
AST SERPL-CCNC: 13 U/L (ref 10–35)
BACTERIA #/AREA URNS HPF: ABNORMAL /HPF
BACTERIA UR CULT: ABNORMAL
BASOPHILS # BLD AUTO: 57 CELLS/UL (ref 0–200)
BASOPHILS NFR BLD AUTO: 0.7 %
BILIRUB SERPL-MCNC: 0.6 MG/DL (ref 0.2–1.2)
BILIRUB UR QL STRIP: NEGATIVE
BUN SERPL-MCNC: 17 MG/DL (ref 7–25)
CALCIUM SERPL-MCNC: 9.2 MG/DL (ref 8.6–10.4)
CAOX CRY #/AREA URNS HPF: ABNORMAL /HPF
CHLORIDE SERPL-SCNC: 105 MMOL/L (ref 98–110)
CHOLEST SERPL-MCNC: 137 MG/DL
CHOLEST/HDLC SERPL: 3.3 (CALC)
CO2 SERPL-SCNC: 23 MMOL/L (ref 20–32)
COLOR UR: ABNORMAL
CREAT SERPL-MCNC: 0.62 MG/DL (ref 0.6–1)
EGFRCR SERPLBLD CKD-EPI 2021: 92 ML/MIN/1.73M2
EOSINOPHIL # BLD AUTO: 130 CELLS/UL (ref 15–500)
EOSINOPHIL NFR BLD AUTO: 1.6 %
ERYTHROCYTE [DISTWIDTH] IN BLOOD BY AUTOMATED COUNT: 12.3 % (ref 11–15)
EST. AVERAGE GLUCOSE BLD GHB EST-MCNC: 120 MG/DL
EST. AVERAGE GLUCOSE BLD GHB EST-SCNC: 6.6 MMOL/L
GLUCOSE SERPL-MCNC: 107 MG/DL (ref 65–99)
GLUCOSE UR QL STRIP: NEGATIVE
HBA1C MFR BLD: 5.8 % OF TOTAL HGB
HCT VFR BLD AUTO: 45.4 % (ref 35–45)
HDLC SERPL-MCNC: 42 MG/DL
HGB BLD-MCNC: 15.2 G/DL (ref 11.7–15.5)
HGB UR QL STRIP: NEGATIVE
HYALINE CASTS #/AREA URNS LPF: ABNORMAL /LPF
KETONES UR QL STRIP: NEGATIVE
LDLC SERPL CALC-MCNC: 65 MG/DL (CALC)
LEUKOCYTE ESTERASE UR QL STRIP: ABNORMAL
LYMPHOCYTES # BLD AUTO: 2948 CELLS/UL (ref 850–3900)
LYMPHOCYTES NFR BLD AUTO: 36.4 %
MCH RBC QN AUTO: 31.2 PG (ref 27–33)
MCHC RBC AUTO-ENTMCNC: 33.5 G/DL (ref 32–36)
MCV RBC AUTO: 93.2 FL (ref 80–100)
MONOCYTES # BLD AUTO: 583 CELLS/UL (ref 200–950)
MONOCYTES NFR BLD AUTO: 7.2 %
NEUTROPHILS # BLD AUTO: 4382 CELLS/UL (ref 1500–7800)
NEUTROPHILS NFR BLD AUTO: 54.1 %
NITRITE UR QL STRIP: NEGATIVE
NONHDLC SERPL-MCNC: 95 MG/DL (CALC)
PH UR STRIP: 5.5 [PH] (ref 5–8)
PLATELET # BLD AUTO: 326 THOUSAND/UL (ref 140–400)
PMV BLD REES-ECKER: 10.7 FL (ref 7.5–12.5)
POTASSIUM SERPL-SCNC: 4.3 MMOL/L (ref 3.5–5.3)
PROT SERPL-MCNC: 6.6 G/DL (ref 6.1–8.1)
PROT UR QL STRIP: ABNORMAL
RBC # BLD AUTO: 4.87 MILLION/UL (ref 3.8–5.1)
RBC #/AREA URNS HPF: ABNORMAL /HPF
SERVICE CMNT-IMP: ABNORMAL
SODIUM SERPL-SCNC: 138 MMOL/L (ref 135–146)
SP GR UR STRIP: 1.02 (ref 1–1.03)
SQUAMOUS #/AREA URNS HPF: ABNORMAL /HPF
TRIGL SERPL-MCNC: 240 MG/DL
WBC # BLD AUTO: 8.1 THOUSAND/UL (ref 3.8–10.8)
WBC #/AREA URNS HPF: ABNORMAL /HPF

## 2025-02-17 ENCOUNTER — APPOINTMENT (OUTPATIENT)
Dept: PRIMARY CARE | Facility: CLINIC | Age: 75
End: 2025-02-17
Payer: MEDICARE

## 2025-02-20 ENCOUNTER — APPOINTMENT (OUTPATIENT)
Dept: PRIMARY CARE | Facility: CLINIC | Age: 75
End: 2025-02-20
Payer: MEDICARE

## 2025-02-20 VITALS
HEART RATE: 70 BPM | OXYGEN SATURATION: 98 % | TEMPERATURE: 97.3 F | HEIGHT: 65 IN | WEIGHT: 187 LBS | SYSTOLIC BLOOD PRESSURE: 128 MMHG | DIASTOLIC BLOOD PRESSURE: 74 MMHG | BODY MASS INDEX: 31.16 KG/M2

## 2025-02-20 DIAGNOSIS — R73.03 PREDIABETES: ICD-10-CM

## 2025-02-20 DIAGNOSIS — I10 ESSENTIAL HYPERTENSION: ICD-10-CM

## 2025-02-20 DIAGNOSIS — Z00.00 ROUTINE GENERAL MEDICAL EXAMINATION AT HEALTH CARE FACILITY: Primary | ICD-10-CM

## 2025-02-20 DIAGNOSIS — Z00.00 WELLNESS EXAMINATION: ICD-10-CM

## 2025-02-20 DIAGNOSIS — E23.2 DI (DIABETES INSIPIDUS): ICD-10-CM

## 2025-02-20 DIAGNOSIS — E83.42 HYPOMAGNESEMIA: ICD-10-CM

## 2025-02-20 DIAGNOSIS — E78.00 HYPERCHOLESTEROLEMIA: ICD-10-CM

## 2025-02-20 PROCEDURE — 99214 OFFICE O/P EST MOD 30 MIN: CPT | Performed by: FAMILY MEDICINE

## 2025-02-20 PROCEDURE — G0439 PPPS, SUBSEQ VISIT: HCPCS | Performed by: FAMILY MEDICINE

## 2025-02-20 PROCEDURE — 3074F SYST BP LT 130 MM HG: CPT | Performed by: FAMILY MEDICINE

## 2025-02-20 PROCEDURE — 1170F FXNL STATUS ASSESSED: CPT | Performed by: FAMILY MEDICINE

## 2025-02-20 PROCEDURE — 1159F MED LIST DOCD IN RCRD: CPT | Performed by: FAMILY MEDICINE

## 2025-02-20 PROCEDURE — 3078F DIAST BP <80 MM HG: CPT | Performed by: FAMILY MEDICINE

## 2025-02-20 PROCEDURE — 1036F TOBACCO NON-USER: CPT | Performed by: FAMILY MEDICINE

## 2025-02-20 PROCEDURE — 99397 PER PM REEVAL EST PAT 65+ YR: CPT | Performed by: FAMILY MEDICINE

## 2025-02-20 RX ORDER — DESMOPRESSIN ACETATE 0.1 MG/1
TABLET ORAL
Qty: 225 TABLET | Refills: 1 | Status: SHIPPED | OUTPATIENT
Start: 2025-02-20

## 2025-02-20 RX ORDER — OLMESARTAN MEDOXOMIL 40 MG/1
40 TABLET ORAL DAILY
Qty: 90 TABLET | Refills: 1 | Status: SHIPPED | OUTPATIENT
Start: 2025-02-20

## 2025-02-20 RX ORDER — EVOLOCUMAB 140 MG/ML
140 INJECTION, SOLUTION SUBCUTANEOUS
Qty: 2 ML | Refills: 2 | Status: SHIPPED | OUTPATIENT
Start: 2025-02-20

## 2025-02-20 RX ORDER — ALIROCUMAB 75 MG/ML
75 INJECTION, SOLUTION SUBCUTANEOUS
Qty: 2 ML | Refills: 2 | Status: CANCELLED | OUTPATIENT
Start: 2025-02-20

## 2025-02-20 ASSESSMENT — ACTIVITIES OF DAILY LIVING (ADL)
BATHING: INDEPENDENT
TAKING_MEDICATION: INDEPENDENT
DRESSING: INDEPENDENT
GROCERY_SHOPPING: INDEPENDENT
MANAGING_FINANCES: INDEPENDENT
DOING_HOUSEWORK: INDEPENDENT

## 2025-02-20 ASSESSMENT — ENCOUNTER SYMPTOMS
DIZZINESS: 0
COUGH: 0
NAUSEA: 0
FEVER: 0
PALPITATIONS: 0
CHILLS: 0
DIARRHEA: 0
SORE THROAT: 0
RHINORRHEA: 0
SHORTNESS OF BREATH: 0
CONSTIPATION: 0
FATIGUE: 0
APPETITE CHANGE: 0
DYSURIA: 0
VOMITING: 0
FREQUENCY: 1
ABDOMINAL PAIN: 0

## 2025-02-20 ASSESSMENT — PATIENT HEALTH QUESTIONNAIRE - PHQ9
SUM OF ALL RESPONSES TO PHQ9 QUESTIONS 1 AND 2: 2
2. FEELING DOWN, DEPRESSED OR HOPELESS: SEVERAL DAYS
1. LITTLE INTEREST OR PLEASURE IN DOING THINGS: SEVERAL DAYS
10. IF YOU CHECKED OFF ANY PROBLEMS, HOW DIFFICULT HAVE THESE PROBLEMS MADE IT FOR YOU TO DO YOUR WORK, TAKE CARE OF THINGS AT HOME, OR GET ALONG WITH OTHER PEOPLE: SOMEWHAT DIFFICULT

## 2025-02-20 NOTE — ASSESSMENT & PLAN NOTE
Ok to resume taking half tab during day to help with urinary symptoms. Will need to monitor BP closely. Recommend consultation with endocrinologist to help optimize therapy.   Orders:    desmopressin (DDAVP) 0.1 mg tablet; Take 1 tab PO bid. May take one half tab at lunch as needed for urinary symptoms.    Referral to Endocrinology; Future

## 2025-02-20 NOTE — ASSESSMENT & PLAN NOTE
Due to flushing from Praluent, switch to Repatha.   Orders:    evolocumab (Repatha SureClick) 140 mg/mL injection; Inject 1 mL (140 mg) under the skin every 14 (fourteen) days.    Lipid Panel; Future    Comprehensive Metabolic Panel; Future

## 2025-02-20 NOTE — ASSESSMENT & PLAN NOTE
Controlled.   Orders:    olmesartan (BENIcar) 40 mg tablet; Take 1 tablet (40 mg) by mouth once daily.    Comprehensive Metabolic Panel; Future

## 2025-02-20 NOTE — PROGRESS NOTES
"Subjective   Reason for Visit: Henri Escobar is an 74 y.o. female here for a Medicare Wellness visit.     Past Medical, Surgical, and Family History reviewed and updated in chart.    Reviewed all medications by prescribing practitioner or clinical pharmacist (such as prescriptions, OTCs, herbal therapies and supplements) and documented in the medical record.    Adwoa started taking the Praluent. Has noticed that after she takes the shot, she immediately gets a flushed feeling in her mouth and labial region. Has noticed an aching in her labia since starting the medicine which gets worse in the few days after she takes the shot. Has not had any rashes.     Since our last visit, she reduced her demopressin down to 1 tab twice per day. Her urinary symptoms have been significantly worse since changing the dose. Would like to resume taking a half tab during the day as needed for symptoms.     Recently was in Urgent Care for UTI. Symptoms have resolved. Was told that magnesium levels were low. She is taking OTC magnesium complex now.         Patient Care Team:  Grace Juarez DO as PCP - General (Family Medicine)  Grace Juarez DO as PCP - [employer] PCP     Review of Systems   Constitutional:  Negative for appetite change, chills, fatigue and fever.   HENT:  Negative for congestion, rhinorrhea and sore throat.    Respiratory:  Negative for cough and shortness of breath.    Cardiovascular:  Negative for chest pain and palpitations.   Gastrointestinal:  Negative for abdominal pain, constipation, diarrhea, nausea and vomiting.   Genitourinary:  Positive for frequency. Negative for dysuria.   Skin:  Negative for rash.   Neurological:  Negative for dizziness.       Objective   Vitals:  /74   Pulse 70   Temp 36.3 °C (97.3 °F)   Ht 1.638 m (5' 4.5\")   Wt 84.8 kg (187 lb)   SpO2 98%   BMI 31.60 kg/m²       Physical Exam  Constitutional:       General: She is not in acute " distress.  HENT:      Head: Normocephalic and atraumatic.      Right Ear: Tympanic membrane normal.      Left Ear: Tympanic membrane normal.      Nose: No congestion or rhinorrhea.      Mouth/Throat:      Mouth: Mucous membranes are moist.      Pharynx: No oropharyngeal exudate or posterior oropharyngeal erythema.   Eyes:      Extraocular Movements: Extraocular movements intact.   Cardiovascular:      Rate and Rhythm: Normal rate and regular rhythm.      Heart sounds: No murmur heard.  Pulmonary:      Effort: Pulmonary effort is normal.      Breath sounds: No wheezing or rhonchi.   Abdominal:      General: There is no distension.      Palpations: Abdomen is soft.      Tenderness: There is no abdominal tenderness. There is no guarding or rebound.   Genitourinary:     General: Normal vulva.      Labia:         Right: No rash, tenderness or lesion.         Left: No rash, tenderness or lesion.    Musculoskeletal:         General: No swelling or tenderness.      Cervical back: Neck supple.      Right lower leg: No edema.      Left lower leg: No edema.   Lymphadenopathy:      Cervical: No cervical adenopathy.      Lower Body: No right inguinal adenopathy. No left inguinal adenopathy.   Skin:     General: Skin is warm and dry.   Neurological:      Mental Status: She is alert.      Cranial Nerves: No cranial nerve deficit.      Motor: No weakness.      Coordination: Coordination normal.      Gait: Gait normal.   Psychiatric:         Mood and Affect: Mood normal.         Behavior: Behavior normal.         Assessment & Plan  Routine general medical examination at health care facility  Medicare AWE performed.        Wellness examination  CPE performed. Recommend pneumonia vaccine and Shingrix vaccine.        Essential hypertension  Controlled.   Orders:    olmesartan (BENIcar) 40 mg tablet; Take 1 tablet (40 mg) by mouth once daily.    Comprehensive Metabolic Panel; Future    Prediabetes  Recheck HgbA1c.  Orders:    Hemoglobin  A1C; Future    DI (diabetes insipidus)  Ok to resume taking half tab during day to help with urinary symptoms. Will need to monitor BP closely. Recommend consultation with endocrinologist to help optimize therapy.   Orders:    desmopressin (DDAVP) 0.1 mg tablet; Take 1 tab PO bid. May take one half tab at lunch as needed for urinary symptoms.    Referral to Endocrinology; Future    Hypercholesterolemia  Due to flushing from Praluent, switch to Repatha.   Orders:    evolocumab (Repatha SureClick) 140 mg/mL injection; Inject 1 mL (140 mg) under the skin every 14 (fourteen) days.    Lipid Panel; Future    Comprehensive Metabolic Panel; Future    Hypomagnesemia  Check magnsium with next labs. Continue OTC supplement.   Orders:    Magnesium; Future

## 2025-03-05 ENCOUNTER — TELEPHONE (OUTPATIENT)
Dept: OBSTETRICS AND GYNECOLOGY | Facility: CLINIC | Age: 75
End: 2025-03-05
Payer: MEDICARE

## 2025-03-05 DIAGNOSIS — Z12.31 ENCOUNTER FOR SCREENING MAMMOGRAM FOR MALIGNANT NEOPLASM OF BREAST: ICD-10-CM

## 2025-03-10 ENCOUNTER — HOSPITAL ENCOUNTER (OUTPATIENT)
Dept: RADIOLOGY | Facility: HOSPITAL | Age: 75
Discharge: HOME | End: 2025-03-10
Payer: MEDICARE

## 2025-03-10 VITALS — WEIGHT: 184 LBS | BODY MASS INDEX: 31.41 KG/M2 | HEIGHT: 64 IN

## 2025-03-10 DIAGNOSIS — Z12.31 ENCOUNTER FOR SCREENING MAMMOGRAM FOR MALIGNANT NEOPLASM OF BREAST: ICD-10-CM

## 2025-03-10 PROCEDURE — 77067 SCR MAMMO BI INCL CAD: CPT

## 2025-03-10 PROCEDURE — 77067 SCR MAMMO BI INCL CAD: CPT | Performed by: RADIOLOGY

## 2025-03-10 PROCEDURE — 77063 BREAST TOMOSYNTHESIS BI: CPT | Performed by: RADIOLOGY

## 2025-03-13 ENCOUNTER — OFFICE VISIT (OUTPATIENT)
Dept: URGENT CARE | Age: 75
End: 2025-03-13
Payer: MEDICARE

## 2025-03-13 VITALS
HEART RATE: 79 BPM | SYSTOLIC BLOOD PRESSURE: 162 MMHG | OXYGEN SATURATION: 97 % | BODY MASS INDEX: 31.58 KG/M2 | WEIGHT: 184 LBS | DIASTOLIC BLOOD PRESSURE: 98 MMHG

## 2025-03-13 DIAGNOSIS — I10 ESSENTIAL HYPERTENSION: ICD-10-CM

## 2025-03-13 DIAGNOSIS — R30.0 DYSURIA: Primary | ICD-10-CM

## 2025-03-13 LAB
POC APPEARANCE, URINE: CLEAR
POC BILIRUBIN, URINE: NEGATIVE
POC BLOOD, URINE: NEGATIVE
POC COLOR, URINE: YELLOW
POC GLUCOSE, URINE: NEGATIVE MG/DL
POC KETONES, URINE: NEGATIVE MG/DL
POC LEUKOCYTES, URINE: NEGATIVE
POC NITRITE,URINE: NEGATIVE
POC PH, URINE: 6 PH
POC PROTEIN, URINE: NEGATIVE MG/DL
POC SPECIFIC GRAVITY, URINE: 1.01
POC UROBILINOGEN, URINE: 0.2 EU/DL

## 2025-03-13 RX ORDER — PHENAZOPYRIDINE HYDROCHLORIDE 100 MG/1
100 TABLET, FILM COATED ORAL 3 TIMES DAILY PRN
Qty: 12 TABLET | Refills: 0 | Status: SHIPPED | OUTPATIENT
Start: 2025-03-13 | End: 2025-03-17

## 2025-03-13 ASSESSMENT — ENCOUNTER SYMPTOMS
FREQUENCY: 1
DYSURIA: 1
CONSTITUTIONAL NEGATIVE: 1
HEADACHES: 1
GASTROINTESTINAL NEGATIVE: 1
RESPIRATORY NEGATIVE: 1
CARDIOVASCULAR NEGATIVE: 1
PSYCHIATRIC NEGATIVE: 1
MUSCULOSKELETAL NEGATIVE: 1

## 2025-03-13 NOTE — PROGRESS NOTES
"Subjective   Patient ID: Henri Escobar \"Ceci" is a 74 y.o. female. They present today with a chief complaint of Urinary Problem (Burning, itchiness, urgency, x3days ).  Had Bactrim at home, took twice daily x 2 days.  Feels it gave her a headache and continues to have urinary burning.        Past Medical History  Allergies as of 03/13/2025 - Reviewed 03/13/2025   Allergen Reaction Noted    Ezetimibe Other 06/06/2023    Metoprolol Other and Unknown 06/07/2023    Mold Unknown 06/06/2023    Omnicef [cefdinir] Hives 06/06/2023    Penicillins Hives 06/06/2023    Praluent pen [alirocumab] Other 02/20/2025    Statins-hmg-coa reductase inhibitors Other 06/06/2023    Cephalosporins Hives, Rash, and Unknown 06/06/2023    Codeine Hives and Rash 06/06/2023       (Not in a hospital admission)       Past Medical History:   Diagnosis Date    Personal history of other diseases of the circulatory system     History of hypertension       Past Surgical History:   Procedure Laterality Date    CATARACT EXTRACTION Right 2024    CHOLECYSTECTOMY      HYSTERECTOMY  02/08/2017    Hysterectomy    OOPHORECTOMY      PARTIAL NEPHRECTOMY Left     for cancer    TUBAL LIGATION          reports that she has never smoked. She has never used smokeless tobacco. She reports that she does not drink alcohol and does not use drugs.    Review of Systems  Review of Systems   Constitutional: Negative.    Respiratory: Negative.     Cardiovascular: Negative.    Gastrointestinal: Negative.    Genitourinary:  Positive for dysuria and frequency.   Musculoskeletal: Negative.    Neurological:  Positive for headaches.   Psychiatric/Behavioral: Negative.                                    Objective    Vitals:    03/13/25 1402   BP: (!) 162/98   Pulse: 79   SpO2: 97%   Weight: 83.5 kg (184 lb)     No LMP recorded. Patient has had a hysterectomy.    Physical Exam  Constitutional:       Appearance: Normal appearance.   Cardiovascular:      Rate and Rhythm: Normal " rate and regular rhythm.      Pulses: Normal pulses.      Heart sounds: Normal heart sounds.   Pulmonary:      Effort: Pulmonary effort is normal.      Breath sounds: Normal breath sounds.   Abdominal:      General: Bowel sounds are normal.      Palpations: Abdomen is soft.      Tenderness: Tenderness: Suprapubic tenderness, No CVA tenderness. There is no right CVA tenderness or left CVA tenderness.   Skin:     General: Skin is warm and dry.   Neurological:      General: No focal deficit present.      Mental Status: She is alert and oriented to person, place, and time. Mental status is at baseline.   Psychiatric:         Mood and Affect: Mood normal.         Behavior: Behavior normal.         Thought Content: Thought content normal.         Judgment: Judgment normal.         Procedures    Point of Care Test & Imaging Results from this visit  Results for orders placed or performed in visit on 03/13/25   POCT UA Automated manually resulted   Result Value Ref Range    POC Color, Urine Yellow Straw, Yellow, Light-Yellow    POC Appearance, Urine Clear Clear    POC Glucose, Urine NEGATIVE NEGATIVE mg/dl    POC Bilirubin, Urine NEGATIVE NEGATIVE    POC Ketones, Urine NEGATIVE NEGATIVE mg/dl    POC Specific Gravity, Urine 1.015 1.005 - 1.035    POC Blood, Urine NEGATIVE NEGATIVE    POC PH, Urine 6.0 No Reference Range Established PH    POC Protein, Urine NEGATIVE NEGATIVE mg/dl    POC Urobilinogen, Urine 0.2 0.2, 1.0 EU/DL    Poc Nitrite, Urine NEGATIVE NEGATIVE    POC Leukocytes, Urine NEGATIVE NEGATIVE      No results found.    Diagnostic study results (if any) were reviewed by ADEBAYO Sahu.    Assessment/Plan   Allergies, medications, history, and pertinent labs/EKGs/Imaging reviewed by ADEBAYO Sahu.     Medical Decision Making  Sending urine for culture, will notify if antibiotic needed for UTI  Increase hydration  Avoid bladder irritants: Alcohol, caffeine, soda  Can take phenazopyridine for  painful urination   Go to ER inability to urinate, abdominal pain, back pain, fever, chills  Follow up with PCP   Elevated blood pressure.  Should monitor BP at home, if remaining >140/80 contact PCP.  If >180/80 go to ER.  Avoid products with pseudoephedrine and NSAIDS (Ibuprofen, Alleve).  Important to take routine prescribed medication     Orders and Diagnoses  Diagnoses and all orders for this visit:  Dysuria  -     POCT UA Automated manually resulted  -     phenazopyridine (Pyridium) 100 mg tablet; Take 1 tablet (100 mg) by mouth 3 times a day as needed for bladder spasms for up to 4 days.  -     Urine Culture  Essential hypertension      Medical Admin Record      Patient disposition: Home    Electronically signed by SANDEEP Sahu-BRIGIDA  2:31 PM

## 2025-03-13 NOTE — PATIENT INSTRUCTIONS
Sending urine for culture, will notify if antibiotic needed for UTI  Increase hydration  Avoid bladder irritants: Alcohol, caffeine, soda  Can take phenazopyridine for painful urination   Go to ER inability to urinate, abdominal pain, back pain, fever, chills  Follow up with PCP    Elevated blood pressure.  Should monitor BP at home, if remaining >140/80 contact PCP.  If >180/80 go to ER.  Avoid products with pseudoephedrine and NSAIDS (Ibuprofen, Alleve).  Important to take routine prescribed medication

## 2025-03-15 LAB — BACTERIA UR CULT: NORMAL

## 2025-04-07 NOTE — PROGRESS NOTES
"Subjective   Patient ID: Henri Escobar \"Ceci" is a 74 y.o. female who presents for vaginal exam.  She presents for gyn visit. Last visit was for annual exam one year ago.  She is s/p supracervical hysterectomy with normal pap in 2017, no past cervical dysplasia, and no indication for further cervical cancer screening.   Mammogram was on 3/10/2025.  DEXA in 2019 showed mild osteopenia of hip.    At last visit she noted a bump on the labia which seemed to flair when she wore yoga pants. Pelvic exam was notable for vaginal atrophy and a small vulvar inclusion cyst at the right vaginal opening.     During last year she started with a new PCP. She did try a weight loss medication last year but she noted constipation. A new cholesterol medication caused gum and labial inflammation and friability. The next medication lead to three UTIs. She is now not taking cholesterol medication. Her mouth has healed and she desires to have the labia checked now. She no longer feels the engorgement or irritation. She did not have any bleeding or discharge. This all occurred within the last three weeks.           Review of Systems   Constitutional:  Negative for activity change.   HENT:  Negative for congestion.    Respiratory:  Negative for apnea and cough.    Cardiovascular:  Negative for chest pain.   Gastrointestinal:  Negative for constipation and diarrhea.   Genitourinary:  Negative for hematuria and vaginal pain.   Musculoskeletal:  Negative for joint swelling.   Neurological:  Negative for dizziness.   Psychiatric/Behavioral:  Negative for agitation.        Past Medical History:   Diagnosis Date    Personal history of other diseases of the circulatory system     History of hypertension      Past Surgical History:   Procedure Laterality Date    CATARACT EXTRACTION Right 2024    CHOLECYSTECTOMY      HYSTERECTOMY  02/08/2017    Hysterectomy    OOPHORECTOMY      PARTIAL NEPHRECTOMY Left     for cancer    TUBAL LIGATION      "   Allergies   Allergen Reactions    Ezetimibe Other    Metoprolol Other and Unknown    Mold Unknown    Omnicef [Cefdinir] Hives    Penicillins Hives    Praluent Pen [Alirocumab] Other     flushing    Statins-Hmg-Coa Reductase Inhibitors Other    Cephalosporins Hives, Rash and Unknown    Codeine Hives and Rash      Current Outpatient Medications on File Prior to Visit   Medication Sig Dispense Refill    ascorbic acid/collagen hydr (COLLAGEN SKIN RENEWAL ORAL) Take by mouth once daily.      clonazePAM (KlonoPIN) 0.5 mg tablet Take 1 tablet (0.5 mg) by mouth 2 times a day as needed.      clotrimazole (Lotrimin) 1 % cream Apply topically 2 times a day. apply to affected area 30 g 1    desmopressin (DDAVP) 0.1 mg tablet Take 1 tab PO bid. May take one half tab at lunch as needed for urinary symptoms. 225 tablet 1    fexofenadine (Allegra) 180 mg tablet Take by mouth once daily.      gabapentin (Neurontin) 100 mg capsule Take 1 capsule (100 mg) by mouth once daily.      gabapentin (Neurontin) 300 mg capsule Take 1 capsule (300 mg) by mouth once daily at bedtime.      magnesium carb,citrate,oxide (MAGNESIUM COMPLEX ORAL) Take by mouth.      mecobalamin, vitamin B12, 1,000 mcg tablet,chewable Chew once daily.      multivitamin with minerals (multivitamin with folic acid) tablet Take 1 tablet by mouth once daily.      olmesartan (BENIcar) 40 mg tablet Take 1 tablet (40 mg) by mouth once daily. 90 tablet 1    evolocumab (Repatha SureClick) 140 mg/mL injection Inject 1 mL (140 mg) under the skin every 14 (fourteen) days. (Patient not taking: Reported on 4/9/2025) 2 mL 2     No current facility-administered medications on file prior to visit.        Objective   Physical Exam  Constitutional:       Appearance: Normal appearance.   Neck:      Thyroid: No thyromegaly.   Cardiovascular:      Rate and Rhythm: Normal rate and regular rhythm.      Heart sounds: Normal heart sounds.   Pulmonary:      Effort: Pulmonary effort is normal.       Breath sounds: Normal breath sounds.   Chest:      Chest wall: No mass.   Breasts:     Right: Normal. No inverted nipple, mass, nipple discharge or skin change.      Left: Normal. No inverted nipple, mass, nipple discharge or skin change.   Abdominal:      General: There is no distension.      Palpations: Abdomen is soft. There is no mass.      Tenderness: There is no abdominal tenderness.   Genitourinary:     General: Normal vulva.      Exam position: Lithotomy position.      Labia:         Right: No rash.         Left: No rash.       Vagina: Normal. No lesions.      Cervix: No friability or lesion.      Uterus: Absent.       Adnexa: Right adnexa normal and left adnexa normal.        Right: No mass or tenderness.          Left: No mass or tenderness.        Comments: Atrophy is noted.  No edema or lesion or discharge is noted.  Musculoskeletal:         General: No deformity.      Cervical back: Neck supple.   Lymphadenopathy:      Cervical: No cervical adenopathy.   Skin:     General: Skin is warm and dry.      Findings: No rash.   Neurological:      General: No focal deficit present.      Mental Status: She is alert.   Psychiatric:         Mood and Affect: Mood normal.         Behavior: Behavior is cooperative.         Thought Content: Thought content normal.           Problem List Items Addressed This Visit          Medium    Essential hypertension     Other Visit Diagnoses       Acute vaginitis    -  Primary         Recent vaginal and vulvar inflammation with cholesterol medication appears resolved. She is reassured. She also is not interested in vaginal estrogen therapy since she is now without symptoms. Follow up in 1 year or as needed.    Regular exercise and attaining/maintaining a healthy weight is encouraged.   Adequate calcium intake with diet or supplements is encouraged.    She declines DEXA.    She agrees to reach out to PCP regarding BP elevation today. She will also recheck pressure later this  morning.

## 2025-04-09 ENCOUNTER — APPOINTMENT (OUTPATIENT)
Dept: OBSTETRICS AND GYNECOLOGY | Facility: CLINIC | Age: 75
End: 2025-04-09
Payer: MEDICARE

## 2025-04-09 VITALS
SYSTOLIC BLOOD PRESSURE: 170 MMHG | WEIGHT: 184 LBS | DIASTOLIC BLOOD PRESSURE: 96 MMHG | BODY MASS INDEX: 31.41 KG/M2 | HEIGHT: 64 IN

## 2025-04-09 DIAGNOSIS — I10 ESSENTIAL HYPERTENSION: ICD-10-CM

## 2025-04-09 DIAGNOSIS — N76.0 ACUTE VAGINITIS: Primary | ICD-10-CM

## 2025-04-09 PROCEDURE — 1036F TOBACCO NON-USER: CPT | Performed by: OBSTETRICS & GYNECOLOGY

## 2025-04-09 PROCEDURE — 99214 OFFICE O/P EST MOD 30 MIN: CPT | Performed by: OBSTETRICS & GYNECOLOGY

## 2025-04-09 PROCEDURE — 1160F RVW MEDS BY RX/DR IN RCRD: CPT | Performed by: OBSTETRICS & GYNECOLOGY

## 2025-04-09 PROCEDURE — 3080F DIAST BP >= 90 MM HG: CPT | Performed by: OBSTETRICS & GYNECOLOGY

## 2025-04-09 PROCEDURE — 1159F MED LIST DOCD IN RCRD: CPT | Performed by: OBSTETRICS & GYNECOLOGY

## 2025-04-09 PROCEDURE — 3077F SYST BP >= 140 MM HG: CPT | Performed by: OBSTETRICS & GYNECOLOGY

## 2025-04-09 SDOH — ECONOMIC STABILITY: FOOD INSECURITY: WITHIN THE PAST 12 MONTHS, YOU WORRIED THAT YOUR FOOD WOULD RUN OUT BEFORE YOU GOT MONEY TO BUY MORE.: NEVER TRUE

## 2025-04-09 SDOH — ECONOMIC STABILITY: FOOD INSECURITY: WITHIN THE PAST 12 MONTHS, THE FOOD YOU BOUGHT JUST DIDN'T LAST AND YOU DIDN'T HAVE MONEY TO GET MORE.: NEVER TRUE

## 2025-04-09 SDOH — ECONOMIC STABILITY: TRANSPORTATION INSECURITY
IN THE PAST 12 MONTHS, HAS THE LACK OF TRANSPORTATION KEPT YOU FROM MEDICAL APPOINTMENTS OR FROM GETTING MEDICATIONS?: NO

## 2025-04-09 SDOH — ECONOMIC STABILITY: TRANSPORTATION INSECURITY
IN THE PAST 12 MONTHS, HAS LACK OF TRANSPORTATION KEPT YOU FROM MEETINGS, WORK, OR FROM GETTING THINGS NEEDED FOR DAILY LIVING?: NO

## 2025-04-09 ASSESSMENT — ENCOUNTER SYMPTOMS
ACTIVITY CHANGE: 0
DIZZINESS: 0
APNEA: 0
CONSTIPATION: 0
AGITATION: 0
HEMATURIA: 0
DIARRHEA: 0
COUGH: 0
JOINT SWELLING: 0

## 2025-04-09 NOTE — ASSESSMENT & PLAN NOTE
Pap is not indicated.  Mammogram is due in 1 year.  She declines DEXA.  Regular exercise and attaining/maintaining a healthy weight is encouraged.   Adequate calcium intake with diet or supplements is encouraged.    We will notify of any abnormal results.

## 2025-04-18 ENCOUNTER — APPOINTMENT (OUTPATIENT)
Dept: PRIMARY CARE | Facility: CLINIC | Age: 75
End: 2025-04-18
Payer: MEDICARE

## 2025-04-18 VITALS
TEMPERATURE: 97.2 F | WEIGHT: 181.8 LBS | DIASTOLIC BLOOD PRESSURE: 92 MMHG | SYSTOLIC BLOOD PRESSURE: 138 MMHG | OXYGEN SATURATION: 98 % | BODY MASS INDEX: 31.21 KG/M2 | HEART RATE: 66 BPM

## 2025-04-18 DIAGNOSIS — E78.00 HYPERCHOLESTEROLEMIA: ICD-10-CM

## 2025-04-18 DIAGNOSIS — N39.0 RECURRENT UTI: ICD-10-CM

## 2025-04-18 DIAGNOSIS — I10 ESSENTIAL HYPERTENSION: Primary | ICD-10-CM

## 2025-04-18 PROCEDURE — 3075F SYST BP GE 130 - 139MM HG: CPT | Performed by: FAMILY MEDICINE

## 2025-04-18 PROCEDURE — 3080F DIAST BP >= 90 MM HG: CPT | Performed by: FAMILY MEDICINE

## 2025-04-18 PROCEDURE — 1159F MED LIST DOCD IN RCRD: CPT | Performed by: FAMILY MEDICINE

## 2025-04-18 PROCEDURE — 1160F RVW MEDS BY RX/DR IN RCRD: CPT | Performed by: FAMILY MEDICINE

## 2025-04-18 PROCEDURE — 1036F TOBACCO NON-USER: CPT | Performed by: FAMILY MEDICINE

## 2025-04-18 PROCEDURE — G2211 COMPLEX E/M VISIT ADD ON: HCPCS | Performed by: FAMILY MEDICINE

## 2025-04-18 PROCEDURE — 99214 OFFICE O/P EST MOD 30 MIN: CPT | Performed by: FAMILY MEDICINE

## 2025-04-18 RX ORDER — AMLODIPINE BESYLATE 5 MG/1
5 TABLET ORAL DAILY
Qty: 30 TABLET | Refills: 1 | Status: SHIPPED | OUTPATIENT
Start: 2025-04-18 | End: 2025-10-15

## 2025-04-18 ASSESSMENT — ENCOUNTER SYMPTOMS
FEVER: 0
PALPITATIONS: 0
SHORTNESS OF BREATH: 0
COUGH: 0

## 2025-04-18 NOTE — PATIENT INSTRUCTIONS
Check your multivitamin to see how much vitamin B12 is in it. If more than 100% daily value, stop taking additional B12 supplement.

## 2025-04-18 NOTE — PROGRESS NOTES
Subjective   Patient ID: Adwoa Escobar is a 74 y.o. female who presents for Hypertension (Had high BP at [facility], wants to discuss ).    Dick has had several high blood pressure readings in other medical offices.  She is taking her olmesartan daily, but is concerned with how high her blood pressure is running.  She is not monitoring her home blood pressures because it often makes her feel more anxious.  She has not had any change in her physical activity levels.  She is not consuming any more salt than normal.  She has not had any chest pain or heart palpitations.    While on the Repatha, she had 3 separate UTIs after not having a UTI for years.  She stopped taking the medication due to adverse effects.         Review of Systems   Constitutional:  Negative for fever.   HENT:  Negative for congestion.    Respiratory:  Negative for cough and shortness of breath.    Cardiovascular:  Negative for chest pain, palpitations and leg swelling.       Objective   BP (!) 138/92   Pulse 66   Temp 36.2 °C (97.2 °F)   Wt 82.5 kg (181 lb 12.8 oz)   SpO2 98%   BMI 31.21 kg/m²     Physical Exam  Constitutional:       General: She is not in acute distress.     Appearance: Normal appearance.   HENT:      Head: Normocephalic.      Nose: No congestion.   Pulmonary:      Effort: Pulmonary effort is normal.   Musculoskeletal:      Cervical back: Neck supple.   Neurological:      General: No focal deficit present.      Mental Status: She is alert.   Psychiatric:         Mood and Affect: Mood normal.         Assessment/Plan   Problem List Items Addressed This Visit           ICD-10-CM    Essential hypertension - Primary I10    Uncontrolled. Continue olmesartan. Start amlodipine 5mg.         Relevant Medications    amLODIPine (Norvasc) 5 mg tablet    Hypercholesterolemia E78.00    Off Repatha due to side effects. Repeat labs in June as scheduled.          Other Visit Diagnoses         Codes      Recurrent UTI     N39.0    Possible  Repatha side effect. Will monitor saskia recurrence since off Repatha now.

## 2025-05-07 DIAGNOSIS — E78.00 HYPERCHOLESTEROLEMIA: ICD-10-CM

## 2025-05-07 DIAGNOSIS — R73.03 PREDIABETES: ICD-10-CM

## 2025-05-07 DIAGNOSIS — I10 ESSENTIAL HYPERTENSION: ICD-10-CM

## 2025-05-07 DIAGNOSIS — E83.42 HYPOMAGNESEMIA: ICD-10-CM

## 2025-06-03 LAB
ALBUMIN SERPL-MCNC: 4.5 G/DL (ref 3.6–5.1)
ALP SERPL-CCNC: 101 U/L (ref 37–153)
ALT SERPL-CCNC: 19 U/L (ref 6–29)
ANION GAP SERPL CALCULATED.4IONS-SCNC: 11 MMOL/L (CALC) (ref 7–17)
AST SERPL-CCNC: 13 U/L (ref 10–35)
BILIRUB SERPL-MCNC: 0.7 MG/DL (ref 0.2–1.2)
BUN SERPL-MCNC: 16 MG/DL (ref 7–25)
CALCIUM SERPL-MCNC: 9.2 MG/DL (ref 8.6–10.4)
CHLORIDE SERPL-SCNC: 104 MMOL/L (ref 98–110)
CHOLEST SERPL-MCNC: 229 MG/DL
CHOLEST/HDLC SERPL: 5.7 (CALC)
CO2 SERPL-SCNC: 23 MMOL/L (ref 20–32)
CREAT SERPL-MCNC: 0.72 MG/DL (ref 0.6–1)
EGFRCR SERPLBLD CKD-EPI 2021: 87 ML/MIN/1.73M2
EST. AVERAGE GLUCOSE BLD GHB EST-MCNC: 123 MG/DL
EST. AVERAGE GLUCOSE BLD GHB EST-SCNC: 6.8 MMOL/L
GLUCOSE SERPL-MCNC: 115 MG/DL (ref 65–99)
HBA1C MFR BLD: 5.9 %
HDLC SERPL-MCNC: 40 MG/DL
LDLC SERPL CALC-MCNC: 153 MG/DL (CALC)
MAGNESIUM SERPL-MCNC: 1.9 MG/DL (ref 1.5–2.5)
NONHDLC SERPL-MCNC: 189 MG/DL (CALC)
POTASSIUM SERPL-SCNC: 4.3 MMOL/L (ref 3.5–5.3)
PROT SERPL-MCNC: 6.8 G/DL (ref 6.1–8.1)
SODIUM SERPL-SCNC: 138 MMOL/L (ref 135–146)
TRIGL SERPL-MCNC: 221 MG/DL

## 2025-06-12 ENCOUNTER — APPOINTMENT (OUTPATIENT)
Dept: PRIMARY CARE | Facility: CLINIC | Age: 75
End: 2025-06-12
Payer: MEDICARE

## 2025-06-12 VITALS
TEMPERATURE: 97.9 F | WEIGHT: 183 LBS | BODY MASS INDEX: 31.41 KG/M2 | HEART RATE: 66 BPM | DIASTOLIC BLOOD PRESSURE: 84 MMHG | SYSTOLIC BLOOD PRESSURE: 140 MMHG | OXYGEN SATURATION: 98 %

## 2025-06-12 DIAGNOSIS — E78.00 HYPERCHOLESTEROLEMIA: ICD-10-CM

## 2025-06-12 DIAGNOSIS — I10 HYPERTENSION, UNSPECIFIED TYPE: Primary | ICD-10-CM

## 2025-06-12 DIAGNOSIS — R35.0 URINARY FREQUENCY: ICD-10-CM

## 2025-06-12 DIAGNOSIS — R73.03 PREDIABETES: ICD-10-CM

## 2025-06-12 DIAGNOSIS — E23.2 DI (DIABETES INSIPIDUS): ICD-10-CM

## 2025-06-12 DIAGNOSIS — E88.810 METABOLIC SYNDROME: ICD-10-CM

## 2025-06-12 DIAGNOSIS — Z85.528 HISTORY OF RENAL CELL CARCINOMA: ICD-10-CM

## 2025-06-12 DIAGNOSIS — L30.4 INTERTRIGO: ICD-10-CM

## 2025-06-12 DIAGNOSIS — F41.9 ANXIETY: ICD-10-CM

## 2025-06-12 LAB
POC APPEARANCE, URINE: CLEAR
POC BILIRUBIN, URINE: NEGATIVE
POC BLOOD, URINE: NEGATIVE
POC COLOR, URINE: NORMAL
POC GLUCOSE, URINE: NEGATIVE MG/DL
POC KETONES, URINE: NEGATIVE MG/DL
POC LEUKOCYTES, URINE: NEGATIVE
POC NITRITE,URINE: NEGATIVE
POC PH, URINE: 7 PH
POC PROTEIN, URINE: NEGATIVE MG/DL
POC SPECIFIC GRAVITY, URINE: 1.01
POC UROBILINOGEN, URINE: 0.2 EU/DL

## 2025-06-12 RX ORDER — CLOTRIMAZOLE 1 %
CREAM (GRAM) TOPICAL 2 TIMES DAILY
Qty: 30 G | Refills: 1 | Status: SHIPPED | OUTPATIENT
Start: 2025-06-12

## 2025-06-12 ASSESSMENT — ENCOUNTER SYMPTOMS
FEVER: 0
COUGH: 0
NERVOUS/ANXIOUS: 1
CHILLS: 0

## 2025-06-12 NOTE — PROGRESS NOTES
Subjective   Patient ID: Adwoa Escobar is a 74 y.o. female who presents for Hypertension (Recheck) and Hyperlipidemia (Review BW).    Adwoa tried taking amlodipine for one month. Her blood pressures improved significantly, but she had migraines while taking the medication. She felt much better after stopping the medication. Has not had any further migraines. Blood pressure readings from home remain elevated.     She still experiences severe anxiety, especially when driving. Has seen multiple psychiatrists and psychologists but has not had much effect. She tries to avoid driving as much as possible.     She would like to have her urine checked since this is how her renal cancer was found in the past. Does experience some urinary frequency but believes this is related to diabetes insipidus.     Clotrimazole has worked well to improve her rash. Rash keep recurring and wants to know what can do to stop itching.          Review of Systems   Constitutional:  Negative for chills and fever.   HENT:  Negative for congestion.    Respiratory:  Negative for cough.    Psychiatric/Behavioral:  The patient is nervous/anxious.        Objective   /84   Pulse 66   Temp 36.6 °C (97.9 °F)   Wt 83 kg (183 lb)   SpO2 98%   BMI 31.41 kg/m²     Physical Exam  Constitutional:       General: She is not in acute distress.     Appearance: Normal appearance.   HENT:      Head: Normocephalic.   Pulmonary:      Effort: Pulmonary effort is normal.   Neurological:      General: No focal deficit present.      Mental Status: She is alert.   Psychiatric:         Attention and Perception: Attention normal.         Mood and Affect: Mood is anxious.         Speech: Speech normal.         Behavior: Behavior normal.         Assessment/Plan   Problem List Items Addressed This Visit           ICD-10-CM    Anxiety F41.9    Following with psychiatry.          DI (diabetes insipidus) E23.2    Scheduled to see endocrinology in August. Continue  current dose of desmopressin.          Essential hypertension - Primary I10    Uncontrolled. Did not tolerate amlodipine or metoprolol. Is on max dose of olmesartan. Discussed adding thiazide diuretic for paradoxical effect on urine output, patient would like to read about the medication before starting it.          Hypercholesterolemia E78.00    Persistently elevated cholesterol. Did not tolerate multiple statins, Praluent or Repatha. Check thyroid to ensure no other contributing factors.          Relevant Orders    TSH with reflex to Free T4 if abnormal    History of renal cell carcinoma Z85.528    S/p resection.         Intertrigo L30.4    Discussed using hair dryer on cool setting to help reduce recurrence. Continue clotrimazole as needed.          Relevant Medications    clotrimazole (Lotrimin) 1 % cream    Metabolic syndrome E88.810    Continue lifestyle changes.          Relevant Orders    TSH with reflex to Free T4 if abnormal    Prediabetes R73.03    Continue to increase physical activity.           Other Visit Diagnoses         Codes      Urinary frequency     R35.0    UA normal.     Relevant Orders    POCT UA Automated manually resulted (Completed)            Time Based Billing:  - Prep Time on Date of Patient Encounter:  1 minutes  - Time Directly with Patient/Family/Caregiver:   45 minutes  - Documentation Time:   9 minutes    Total Minutes:  55

## 2025-06-12 NOTE — PATIENT INSTRUCTIONS
Consider trying Replens (found in aisle with tampons)    Thiazides (hydrochlorothiazide) in diabetes insipidus

## 2025-06-12 NOTE — ASSESSMENT & PLAN NOTE
Discussed using hair dryer on cool setting to help reduce recurrence. Continue clotrimazole as needed.

## 2025-06-12 NOTE — ASSESSMENT & PLAN NOTE
Persistently elevated cholesterol. Did not tolerate multiple statins, Praluent or Repatha. Check thyroid to ensure no other contributing factors.

## 2025-06-13 LAB — TSH SERPL-ACNC: 2.32 MIU/L (ref 0.4–4.5)

## 2025-06-17 ENCOUNTER — TELEPHONE (OUTPATIENT)
Dept: PRIMARY CARE | Facility: CLINIC | Age: 75
End: 2025-06-17
Payer: MEDICARE

## 2025-06-17 DIAGNOSIS — I10 HYPERTENSION, UNSPECIFIED TYPE: Primary | ICD-10-CM

## 2025-06-17 NOTE — TELEPHONE ENCOUNTER
Please let her know that her thyroid test was normal. I placed referral to cardiologist Dr Moya in [place].

## 2025-07-28 ENCOUNTER — APPOINTMENT (OUTPATIENT)
Dept: PRIMARY CARE | Facility: CLINIC | Age: 75
End: 2025-07-28
Payer: MEDICARE

## 2025-07-28 VITALS
HEART RATE: 63 BPM | WEIGHT: 182 LBS | TEMPERATURE: 97.8 F | DIASTOLIC BLOOD PRESSURE: 84 MMHG | OXYGEN SATURATION: 98 % | SYSTOLIC BLOOD PRESSURE: 124 MMHG | BODY MASS INDEX: 31.24 KG/M2

## 2025-07-28 DIAGNOSIS — N95.2 POST-MENOPAUSAL ATROPHIC VAGINITIS: ICD-10-CM

## 2025-07-28 DIAGNOSIS — N39.0 RECURRENT UTI: Primary | ICD-10-CM

## 2025-07-28 PROCEDURE — 99214 OFFICE O/P EST MOD 30 MIN: CPT | Performed by: FAMILY MEDICINE

## 2025-07-28 PROCEDURE — 3079F DIAST BP 80-89 MM HG: CPT | Performed by: FAMILY MEDICINE

## 2025-07-28 PROCEDURE — G2211 COMPLEX E/M VISIT ADD ON: HCPCS | Performed by: FAMILY MEDICINE

## 2025-07-28 PROCEDURE — 1159F MED LIST DOCD IN RCRD: CPT | Performed by: FAMILY MEDICINE

## 2025-07-28 PROCEDURE — 1036F TOBACCO NON-USER: CPT | Performed by: FAMILY MEDICINE

## 2025-07-28 PROCEDURE — 3074F SYST BP LT 130 MM HG: CPT | Performed by: FAMILY MEDICINE

## 2025-07-28 RX ORDER — NITROFURANTOIN 25; 75 MG/1; MG/1
100 CAPSULE ORAL 2 TIMES DAILY
COMMUNITY

## 2025-07-28 RX ORDER — ESTRADIOL 0.1 MG/G
CREAM VAGINAL
Qty: 42.5 G | Refills: 5 | Status: SHIPPED | OUTPATIENT
Start: 2025-07-28

## 2025-07-28 ASSESSMENT — ENCOUNTER SYMPTOMS
FLANK PAIN: 0
DYSURIA: 1
FEVER: 0
FREQUENCY: 1
COUGH: 0

## 2025-07-28 NOTE — PROGRESS NOTES
Subjective   Patient ID: Adwoa Escobar is a 74 y.o. female who presents for Hospital Follow-up ([facility] FU, last week x 2 Re: UTI).    Adwoa was in the urgent care over the weekend for urinary issue. She was diagnosed with UTI. Initially was treated with Bactrim, but that medication dd not work well. The urgent care provider called in ciprofloxacin. She was taking the cipro and starting to feel better when she got a call that medication needed to be changed again. She was started on nitrofurantoin. Symptoms have improved somewhat. Still having some urinary issues but they are improved. She has noticed dryness in vaginal area. Tries to drink fluids befre sex and always urinate after, but she keeps getting UTIs.          Review of Systems   Constitutional:  Negative for fever.   Respiratory:  Negative for cough.    Genitourinary:  Positive for dysuria and frequency. Negative for flank pain.       Objective   /84   Pulse 63   Temp 36.6 °C (97.8 °F)   Wt 82.6 kg (182 lb)   SpO2 98%   BMI 31.24 kg/m²     Physical Exam  Constitutional:       General: She is not in acute distress.     Appearance: Normal appearance.   HENT:      Head: Normocephalic.   Pulmonary:      Effort: Pulmonary effort is normal.     Neurological:      General: No focal deficit present.      Mental Status: She is alert.     Psychiatric:         Mood and Affect: Mood normal.         Assessment/Plan   Diagnoses and all orders for this visit:  Recurrent UTI  Comments:  Continue Macrobid. Call office if symptoms do not completely resolve and will order urine culture.  Post-menopausal atrophic vaginitis  Comments:  Start topical estrogen.  Orders:  -     estradiol (Estrace) 0.01 % (0.1 mg/gram) vaginal cream; Insert 0.5 applicatorful into vagina nightly for 1 week then every Monday/Wednesday/Friday.

## 2025-08-04 ENCOUNTER — TELEPHONE (OUTPATIENT)
Dept: CARDIOLOGY | Facility: HOSPITAL | Age: 75
End: 2025-08-04
Payer: MEDICARE

## 2025-08-04 ENCOUNTER — TELEPHONE (OUTPATIENT)
Dept: PRIMARY CARE | Facility: CLINIC | Age: 75
End: 2025-08-04
Payer: MEDICARE

## 2025-08-04 DIAGNOSIS — N39.0 RECURRENT UTI: Primary | ICD-10-CM

## 2025-08-04 NOTE — TELEPHONE ENCOUNTER
Pt called stating that SOFIE told her at appt on 7/28 tp cb if still having UTI sx's, she is still having sx's.    Pt requesting another UA and cx at lab.  Please advise Thx

## 2025-08-07 ENCOUNTER — TELEPHONE (OUTPATIENT)
Dept: PRIMARY CARE | Facility: CLINIC | Age: 75
End: 2025-08-07

## 2025-08-07 DIAGNOSIS — R35.0 URINARY FREQUENCY: Primary | ICD-10-CM

## 2025-08-07 LAB — BACTERIA UR CULT: NORMAL

## 2025-08-22 ENCOUNTER — APPOINTMENT (OUTPATIENT)
Dept: CARDIOLOGY | Facility: HOSPITAL | Age: 75
End: 2025-08-22
Payer: MEDICARE

## 2025-08-22 VITALS
HEART RATE: 76 BPM | HEIGHT: 64 IN | SYSTOLIC BLOOD PRESSURE: 160 MMHG | DIASTOLIC BLOOD PRESSURE: 90 MMHG | BODY MASS INDEX: 30.9 KG/M2 | WEIGHT: 181 LBS

## 2025-08-22 DIAGNOSIS — I10 ESSENTIAL HYPERTENSION: ICD-10-CM

## 2025-08-22 DIAGNOSIS — E78.49 FAMILIAL HYPERLIPIDEMIA: Primary | ICD-10-CM

## 2025-08-22 LAB
ATRIAL RATE: 76 BPM
P AXIS: 49 DEGREES
P OFFSET: 211 MS
P ONSET: 154 MS
PR INTERVAL: 144 MS
Q ONSET: 226 MS
QRS COUNT: 13 BEATS
QRS DURATION: 78 MS
QT INTERVAL: 372 MS
QTC CALCULATION(BAZETT): 418 MS
QTC FREDERICIA: 402 MS
R AXIS: 71 DEGREES
T AXIS: 27 DEGREES
T OFFSET: 412 MS
VENTRICULAR RATE: 76 BPM

## 2025-08-22 PROCEDURE — 3077F SYST BP >= 140 MM HG: CPT | Performed by: INTERNAL MEDICINE

## 2025-08-22 PROCEDURE — 99205 OFFICE O/P NEW HI 60 MIN: CPT | Performed by: INTERNAL MEDICINE

## 2025-08-22 PROCEDURE — 1159F MED LIST DOCD IN RCRD: CPT | Performed by: INTERNAL MEDICINE

## 2025-08-22 PROCEDURE — 3080F DIAST BP >= 90 MM HG: CPT | Performed by: INTERNAL MEDICINE

## 2025-08-22 PROCEDURE — 93005 ELECTROCARDIOGRAM TRACING: CPT | Performed by: INTERNAL MEDICINE

## 2025-08-22 PROCEDURE — 1036F TOBACCO NON-USER: CPT | Performed by: INTERNAL MEDICINE

## 2025-08-22 PROCEDURE — 99213 OFFICE O/P EST LOW 20 MIN: CPT

## 2025-08-22 RX ORDER — OLMESARTAN MEDOXOMIL 40 MG/1
40 TABLET ORAL DAILY
Qty: 90 TABLET | Refills: 1 | Status: SHIPPED | OUTPATIENT
Start: 2025-08-22

## 2025-08-22 RX ORDER — SPIRONOLACTONE 25 MG/1
25 TABLET ORAL DAILY
Qty: 30 TABLET | Refills: 11 | Status: SHIPPED | OUTPATIENT
Start: 2025-08-22 | End: 2026-08-22

## 2025-08-22 ASSESSMENT — ENCOUNTER SYMPTOMS
OCCASIONAL FEELINGS OF UNSTEADINESS: 0
DEPRESSION: 0
LOSS OF SENSATION IN FEET: 0

## 2025-08-27 ENCOUNTER — APPOINTMENT (OUTPATIENT)
Dept: ENDOCRINOLOGY | Facility: CLINIC | Age: 75
End: 2025-08-27
Payer: MEDICARE

## 2025-08-27 DIAGNOSIS — E23.2 DI (DIABETES INSIPIDUS): ICD-10-CM

## 2025-08-27 RX ORDER — DESMOPRESSIN ACETATE 0.1 MG/1
TABLET ORAL
Qty: 225 TABLET | Refills: 1 | Status: SHIPPED | OUTPATIENT
Start: 2025-08-27

## 2025-08-27 ASSESSMENT — COLUMBIA-SUICIDE SEVERITY RATING SCALE - C-SSRS
6. HAVE YOU EVER DONE ANYTHING, STARTED TO DO ANYTHING, OR PREPARED TO DO ANYTHING TO END YOUR LIFE?: NO
2. HAVE YOU ACTUALLY HAD ANY THOUGHTS OF KILLING YOURSELF?: NO
1. IN THE PAST MONTH, HAVE YOU WISHED YOU WERE DEAD OR WISHED YOU COULD GO TO SLEEP AND NOT WAKE UP?: NO

## 2025-08-27 ASSESSMENT — PATIENT HEALTH QUESTIONNAIRE - PHQ9
SUM OF ALL RESPONSES TO PHQ9 QUESTIONS 1 AND 2: 0
2. FEELING DOWN, DEPRESSED OR HOPELESS: NOT AT ALL
1. LITTLE INTEREST OR PLEASURE IN DOING THINGS: NOT AT ALL

## 2025-08-27 ASSESSMENT — ENCOUNTER SYMPTOMS
NERVOUS/ANXIOUS: 1
ARTHRALGIAS: 1

## 2025-09-01 PROBLEM — Z13.29 SCREENING FOR THYROID DISORDER: Status: ACTIVE | Noted: 2024-03-17

## 2025-09-01 PROBLEM — E27.9 ADRENAL NODULE: Status: ACTIVE | Noted: 2025-09-01

## 2025-09-04 ENCOUNTER — RESULTS FOLLOW-UP (OUTPATIENT)
Dept: ENDOCRINOLOGY | Facility: CLINIC | Age: 75
End: 2025-09-04
Payer: MEDICARE

## 2025-09-08 ENCOUNTER — APPOINTMENT (OUTPATIENT)
Dept: UROLOGY | Facility: CLINIC | Age: 75
End: 2025-09-08
Payer: MEDICARE

## 2025-12-12 ENCOUNTER — APPOINTMENT (OUTPATIENT)
Dept: PRIMARY CARE | Facility: CLINIC | Age: 75
End: 2025-12-12
Payer: MEDICARE

## 2026-03-18 ENCOUNTER — APPOINTMENT (OUTPATIENT)
Dept: ENDOCRINOLOGY | Facility: CLINIC | Age: 76
End: 2026-03-18
Payer: MEDICARE

## 2026-04-30 ENCOUNTER — APPOINTMENT (OUTPATIENT)
Dept: OBSTETRICS AND GYNECOLOGY | Facility: CLINIC | Age: 76
End: 2026-04-30
Payer: MEDICARE